# Patient Record
Sex: FEMALE | Race: OTHER | NOT HISPANIC OR LATINO | URBAN - METROPOLITAN AREA
[De-identification: names, ages, dates, MRNs, and addresses within clinical notes are randomized per-mention and may not be internally consistent; named-entity substitution may affect disease eponyms.]

---

## 2017-12-11 ENCOUNTER — EMERGENCY (EMERGENCY)
Facility: HOSPITAL | Age: 77
LOS: 1 days | Discharge: HOME | End: 2017-12-11
Attending: EMERGENCY MEDICINE
Payer: MEDICARE

## 2017-12-11 VITALS
OXYGEN SATURATION: 97 % | HEIGHT: 62 IN | TEMPERATURE: 97 F | RESPIRATION RATE: 16 BRPM | DIASTOLIC BLOOD PRESSURE: 111 MMHG | HEART RATE: 70 BPM | SYSTOLIC BLOOD PRESSURE: 181 MMHG | WEIGHT: 184.97 LBS

## 2017-12-11 VITALS
SYSTOLIC BLOOD PRESSURE: 121 MMHG | RESPIRATION RATE: 18 BRPM | HEART RATE: 65 BPM | OXYGEN SATURATION: 96 % | DIASTOLIC BLOOD PRESSURE: 68 MMHG

## 2017-12-11 PROCEDURE — 99284 EMERGENCY DEPT VISIT MOD MDM: CPT

## 2017-12-11 PROCEDURE — 70450 CT HEAD/BRAIN W/O DYE: CPT

## 2017-12-11 PROCEDURE — 93005 ELECTROCARDIOGRAM TRACING: CPT

## 2017-12-11 PROCEDURE — 70450 CT HEAD/BRAIN W/O DYE: CPT | Mod: 26

## 2017-12-11 PROCEDURE — 99284 EMERGENCY DEPT VISIT MOD MDM: CPT | Mod: 25

## 2017-12-11 RX ORDER — LABETALOL HCL 100 MG
100 TABLET ORAL ONCE
Qty: 0 | Refills: 0 | Status: COMPLETED | OUTPATIENT
Start: 2017-12-11 | End: 2017-12-11

## 2017-12-11 RX ORDER — ONDANSETRON 8 MG/1
8 TABLET, FILM COATED ORAL ONCE
Qty: 0 | Refills: 0 | Status: COMPLETED | OUTPATIENT
Start: 2017-12-11 | End: 2017-12-11

## 2017-12-11 RX ADMIN — ONDANSETRON 8 MILLIGRAM(S): 8 TABLET, FILM COATED ORAL at 21:56

## 2017-12-11 RX ADMIN — Medication 100 MILLIGRAM(S): at 20:33

## 2017-12-11 NOTE — ED PROVIDER NOTE - MEDICAL DECISION MAKING DETAILS
Pt having a hypertensive episode. Will give pt Labetalol and Zofran. Will do Ct head and EKG. Reassess.

## 2017-12-11 NOTE — ED ADULT NURSE NOTE - OBJECTIVE STATEMENT
Pt daughter gave brief summery of pt symptoms. Pt is complaining of high BP with headache and vomiting. Pt is awake, equal grasp reflex.

## 2017-12-11 NOTE — ED PROVIDER NOTE - CONSTITUTIONAL, MLM
normal... Well appearing, well nourished, awake, alert, oriented to person, place, time/situation and in no apparent distress.  Pt's BP was initially found to be (180/90).

## 2017-12-11 NOTE — ED PROVIDER NOTE - OBJECTIVE STATEMENT
69 y/o F pt w/ a PMHx of HTN presents to the  ED c/o hypertensive episode and associated HA since today. Pt states that she went to her gynecologist today for a regular check up and was found to have a BP of 210/110. She was referred to her PMD and was told to take an extra dose of Cretan. Pt denies visual changes, focal neuro deficits, and any other complaints. Pt reports associated episodes of emesis x 2. NKDA.

## 2017-12-11 NOTE — ED ADULT NURSE NOTE - NS ED NOTE ABUSE SUSPICION NEGLECT YN
Sacha Noriega Lula's SCREENING SCHEDULE   Tests on this list are recommended by your physician but may not be covered, or covered at this frequency, by your insurer. Please check with your insurance carrier before scheduling to verify coverage.    PARESH their lifetime   • Anyone with a family history    Colorectal Cancer Screening  Covered up to Age 76     Colonoscopy Screen   Covered every 10 years- more often if abnormal Colonoscopy,10 Years due on 03/26/1998   due Update Health Maintenance if applicabl Pneumococcal 13 (Prevnar)  Covered Once after 65 No orders found for this or any previous visit. today Please get once after your 65th birthday    Pneumococcal 23 (Pneumovax)  Covered Once after 65 No orders found for this or any previous visit.  up to date No

## 2018-09-06 ENCOUNTER — INPATIENT (INPATIENT)
Facility: HOSPITAL | Age: 78
LOS: 2 days | Discharge: HOME HEALTH | End: 2018-09-09
Attending: STUDENT IN AN ORGANIZED HEALTH CARE EDUCATION/TRAINING PROGRAM | Admitting: STUDENT IN AN ORGANIZED HEALTH CARE EDUCATION/TRAINING PROGRAM
Payer: MEDICARE

## 2018-09-06 VITALS
HEART RATE: 73 BPM | SYSTOLIC BLOOD PRESSURE: 197 MMHG | OXYGEN SATURATION: 98 % | RESPIRATION RATE: 18 BRPM | TEMPERATURE: 98 F | DIASTOLIC BLOOD PRESSURE: 123 MMHG

## 2018-09-06 DIAGNOSIS — Z98.890 OTHER SPECIFIED POSTPROCEDURAL STATES: Chronic | ICD-10-CM

## 2018-09-06 DIAGNOSIS — F32.9 MAJOR DEPRESSIVE DISORDER, SINGLE EPISODE, UNSPECIFIED: ICD-10-CM

## 2018-09-06 DIAGNOSIS — E87.1 HYPO-OSMOLALITY AND HYPONATREMIA: ICD-10-CM

## 2018-09-06 DIAGNOSIS — J45.909 UNSPECIFIED ASTHMA, UNCOMPLICATED: ICD-10-CM

## 2018-09-06 DIAGNOSIS — E78.00 PURE HYPERCHOLESTEROLEMIA, UNSPECIFIED: ICD-10-CM

## 2018-09-06 DIAGNOSIS — Z29.9 ENCOUNTER FOR PROPHYLACTIC MEASURES, UNSPECIFIED: ICD-10-CM

## 2018-09-06 DIAGNOSIS — I10 ESSENTIAL (PRIMARY) HYPERTENSION: ICD-10-CM

## 2018-09-06 LAB
ALBUMIN SERPL ELPH-MCNC: 4.1 G/DL — SIGNIFICANT CHANGE UP (ref 3.3–5)
ALP SERPL-CCNC: 56 U/L — SIGNIFICANT CHANGE UP (ref 40–120)
ALT FLD-CCNC: 15 U/L — SIGNIFICANT CHANGE UP (ref 4–33)
APPEARANCE UR: CLEAR — SIGNIFICANT CHANGE UP
AST SERPL-CCNC: 21 U/L — SIGNIFICANT CHANGE UP (ref 4–32)
BACTERIA # UR AUTO: NEGATIVE — SIGNIFICANT CHANGE UP
BASOPHILS # BLD AUTO: 0.05 K/UL — SIGNIFICANT CHANGE UP (ref 0–0.2)
BASOPHILS NFR BLD AUTO: 0.9 % — SIGNIFICANT CHANGE UP (ref 0–2)
BILIRUB SERPL-MCNC: 1 MG/DL — SIGNIFICANT CHANGE UP (ref 0.2–1.2)
BILIRUB UR-MCNC: NEGATIVE — SIGNIFICANT CHANGE UP
BLOOD UR QL VISUAL: NEGATIVE — SIGNIFICANT CHANGE UP
BUN SERPL-MCNC: 15 MG/DL — SIGNIFICANT CHANGE UP (ref 7–23)
CALCIUM SERPL-MCNC: 9.5 MG/DL — SIGNIFICANT CHANGE UP (ref 8.4–10.5)
CHLORIDE SERPL-SCNC: 88 MMOL/L — LOW (ref 98–107)
CHLORIDE UR-SCNC: 108 MMOL/L — SIGNIFICANT CHANGE UP
CO2 SERPL-SCNC: 26 MMOL/L — SIGNIFICANT CHANGE UP (ref 22–31)
COLOR SPEC: SIGNIFICANT CHANGE UP
CREAT ?TM UR-MCNC: 52.1 MG/DL — SIGNIFICANT CHANGE UP
CREAT SERPL-MCNC: 0.89 MG/DL — SIGNIFICANT CHANGE UP (ref 0.5–1.3)
EOSINOPHIL # BLD AUTO: 0.04 K/UL — SIGNIFICANT CHANGE UP (ref 0–0.5)
EOSINOPHIL NFR BLD AUTO: 0.7 % — SIGNIFICANT CHANGE UP (ref 0–6)
GLUCOSE SERPL-MCNC: 110 MG/DL — HIGH (ref 70–99)
GLUCOSE UR-MCNC: NEGATIVE — SIGNIFICANT CHANGE UP
HCT VFR BLD CALC: 35.2 % — SIGNIFICANT CHANGE UP (ref 34.5–45)
HGB BLD-MCNC: 12 G/DL — SIGNIFICANT CHANGE UP (ref 11.5–15.5)
HYALINE CASTS # UR AUTO: NEGATIVE — SIGNIFICANT CHANGE UP
IMM GRANULOCYTES # BLD AUTO: 0.01 # — SIGNIFICANT CHANGE UP
IMM GRANULOCYTES NFR BLD AUTO: 0.2 % — SIGNIFICANT CHANGE UP (ref 0–1.5)
KETONES UR-MCNC: SIGNIFICANT CHANGE UP
LEUKOCYTE ESTERASE UR-ACNC: SIGNIFICANT CHANGE UP
LYMPHOCYTES # BLD AUTO: 1.51 K/UL — SIGNIFICANT CHANGE UP (ref 1–3.3)
LYMPHOCYTES # BLD AUTO: 26.6 % — SIGNIFICANT CHANGE UP (ref 13–44)
MCHC RBC-ENTMCNC: 28.6 PG — SIGNIFICANT CHANGE UP (ref 27–34)
MCHC RBC-ENTMCNC: 34.1 % — SIGNIFICANT CHANGE UP (ref 32–36)
MCV RBC AUTO: 84 FL — SIGNIFICANT CHANGE UP (ref 80–100)
MONOCYTES # BLD AUTO: 0.47 K/UL — SIGNIFICANT CHANGE UP (ref 0–0.9)
MONOCYTES NFR BLD AUTO: 8.3 % — SIGNIFICANT CHANGE UP (ref 2–14)
NEUTROPHILS # BLD AUTO: 3.59 K/UL — SIGNIFICANT CHANGE UP (ref 1.8–7.4)
NEUTROPHILS NFR BLD AUTO: 63.3 % — SIGNIFICANT CHANGE UP (ref 43–77)
NITRITE UR-MCNC: NEGATIVE — SIGNIFICANT CHANGE UP
NRBC # FLD: 0 — SIGNIFICANT CHANGE UP
OSMOLALITY SERPL: 261 MOSMO/KG — LOW (ref 275–295)
OSMOLALITY UR: 422 MOSMO/KG — SIGNIFICANT CHANGE UP (ref 50–1200)
PH UR: 6.5 — SIGNIFICANT CHANGE UP (ref 5–8)
PLATELET # BLD AUTO: 173 K/UL — SIGNIFICANT CHANGE UP (ref 150–400)
PMV BLD: 11 FL — SIGNIFICANT CHANGE UP (ref 7–13)
POTASSIUM SERPL-MCNC: 4 MMOL/L — SIGNIFICANT CHANGE UP (ref 3.5–5.3)
POTASSIUM SERPL-SCNC: 4 MMOL/L — SIGNIFICANT CHANGE UP (ref 3.5–5.3)
PROT SERPL-MCNC: 7.2 G/DL — SIGNIFICANT CHANGE UP (ref 6–8.3)
PROT UR-MCNC: NEGATIVE — SIGNIFICANT CHANGE UP
RBC # BLD: 4.19 M/UL — SIGNIFICANT CHANGE UP (ref 3.8–5.2)
RBC # FLD: 11.9 % — SIGNIFICANT CHANGE UP (ref 10.3–14.5)
RBC CASTS # UR COMP ASSIST: SIGNIFICANT CHANGE UP (ref 0–?)
SODIUM SERPL-SCNC: 124 MMOL/L — LOW (ref 135–145)
SODIUM UR-SCNC: 127 MMOL/L — SIGNIFICANT CHANGE UP
SP GR SPEC: 1.01 — SIGNIFICANT CHANGE UP (ref 1–1.04)
SQUAMOUS # UR AUTO: SIGNIFICANT CHANGE UP
UROBILINOGEN FLD QL: NORMAL — SIGNIFICANT CHANGE UP
WBC # BLD: 5.67 K/UL — SIGNIFICANT CHANGE UP (ref 3.8–10.5)
WBC # FLD AUTO: 5.67 K/UL — SIGNIFICANT CHANGE UP (ref 3.8–10.5)
WBC UR QL: SIGNIFICANT CHANGE UP (ref 0–?)

## 2018-09-06 PROCEDURE — 99223 1ST HOSP IP/OBS HIGH 75: CPT

## 2018-09-06 PROCEDURE — 70450 CT HEAD/BRAIN W/O DYE: CPT | Mod: 26

## 2018-09-06 RX ORDER — HEPARIN SODIUM 5000 [USP'U]/ML
5000 INJECTION INTRAVENOUS; SUBCUTANEOUS EVERY 12 HOURS
Qty: 0 | Refills: 0 | Status: DISCONTINUED | OUTPATIENT
Start: 2018-09-06 | End: 2018-09-09

## 2018-09-06 RX ORDER — LOSARTAN POTASSIUM 100 MG/1
100 TABLET, FILM COATED ORAL DAILY
Qty: 0 | Refills: 0 | Status: DISCONTINUED | OUTPATIENT
Start: 2018-09-06 | End: 2018-09-09

## 2018-09-06 RX ORDER — ALBUTEROL 90 UG/1
2 AEROSOL, METERED ORAL EVERY 6 HOURS
Qty: 0 | Refills: 0 | Status: DISCONTINUED | OUTPATIENT
Start: 2018-09-06 | End: 2018-09-09

## 2018-09-06 RX ORDER — LABETALOL HCL 100 MG
10 TABLET ORAL ONCE
Qty: 0 | Refills: 0 | Status: COMPLETED | OUTPATIENT
Start: 2018-09-06 | End: 2018-09-06

## 2018-09-06 RX ORDER — ATORVASTATIN CALCIUM 80 MG/1
10 TABLET, FILM COATED ORAL AT BEDTIME
Qty: 0 | Refills: 0 | Status: DISCONTINUED | OUTPATIENT
Start: 2018-09-06 | End: 2018-09-09

## 2018-09-06 RX ORDER — MONTELUKAST 4 MG/1
10 TABLET, CHEWABLE ORAL DAILY
Qty: 0 | Refills: 0 | Status: DISCONTINUED | OUTPATIENT
Start: 2018-09-06 | End: 2018-09-09

## 2018-09-06 RX ORDER — PANTOPRAZOLE SODIUM 20 MG/1
40 TABLET, DELAYED RELEASE ORAL
Qty: 0 | Refills: 0 | Status: DISCONTINUED | OUTPATIENT
Start: 2018-09-06 | End: 2018-09-09

## 2018-09-06 RX ORDER — METOCLOPRAMIDE HCL 10 MG
10 TABLET ORAL ONCE
Qty: 0 | Refills: 0 | Status: COMPLETED | OUTPATIENT
Start: 2018-09-06 | End: 2018-09-06

## 2018-09-06 RX ORDER — SODIUM CHLORIDE 9 MG/ML
1000 INJECTION INTRAMUSCULAR; INTRAVENOUS; SUBCUTANEOUS
Qty: 0 | Refills: 0 | Status: DISCONTINUED | OUTPATIENT
Start: 2018-09-06 | End: 2018-09-07

## 2018-09-06 RX ORDER — ONDANSETRON 8 MG/1
4 TABLET, FILM COATED ORAL EVERY 8 HOURS
Qty: 0 | Refills: 0 | Status: DISCONTINUED | OUTPATIENT
Start: 2018-09-06 | End: 2018-09-09

## 2018-09-06 RX ADMIN — SODIUM CHLORIDE 75 MILLILITER(S): 9 INJECTION INTRAMUSCULAR; INTRAVENOUS; SUBCUTANEOUS at 23:04

## 2018-09-06 RX ADMIN — Medication 10 MILLIGRAM(S): at 19:43

## 2018-09-06 RX ADMIN — Medication 10 MILLIGRAM(S): at 19:40

## 2018-09-06 NOTE — H&P ADULT - PROBLEM SELECTOR PLAN 1
Most likely due to Dehydration, Heat, on HCTZ and Cymbalta,   Hold HCTZ and Cymbalta,   IVF NS @ 75 cc/hr x 6 hours, F/U BMP, CBC, Cortisol and Aldosterone Level,   Fall/aspiration precaution, PT consult,

## 2018-09-06 NOTE — H&P ADULT - PMH
Asthma    HTN (hypertension)    Hypercholesterolemia Allergy    Asthma    Depression    HTN (hypertension)    Hypercholesterolemia

## 2018-09-06 NOTE — H&P ADULT - MUSCULOSKELETAL
details… detailed exam no joint erythema/no calf tenderness/no joint warmth/normal strength/ROM intact/no joint swelling

## 2018-09-06 NOTE — ED ADULT TRIAGE NOTE - CHIEF COMPLAINT QUOTE
Patient with a h/o HTN arrives from home for high blood pressure, headaches, and vomiting since yesterday. Hypertensive in triage. Had her BP med this morning.  Losartan .

## 2018-09-06 NOTE — H&P ADULT - BACK
Patient admitted to room 2037. Oriented to room bed locked and low position call light within reach. detailed exam

## 2018-09-06 NOTE — ED PROVIDER NOTE - MEDICAL DECISION MAKING DETAILS
79yo F hx of htn, hld, asthma presenting with elevated BP, nausea and headache starting around 2pm today. Concern for hypertensive urgency vs intracranial issue/bleed. CTH, labs, BP control, anti-nausea meds.

## 2018-09-06 NOTE — ED PROVIDER NOTE - ATTENDING CONTRIBUTION TO CARE
Dr. Ford: I have personally performed a face to face bedside history and physical examination of this patient. I have discussed the history, examination, review of systems, assessment and plan of management with the resident. I have reviewed the electronic medical record and amended it to reflect my history, review of systems, physical exam, assessment and plan.    pt with possible hypertensive emergency. hypertensive, severe headache vomiting, headache resolved on presentation to ED.  r/o ICH/SAH, brain mass. plan for CT head, labs, pain control, IV labetalol.

## 2018-09-06 NOTE — ED ADULT NURSE REASSESSMENT NOTE - NS ED NURSE REASSESS COMMENT FT1
report received from Emmanuelle GALVEZ, pt reports relief from headache, blood pressure lower, as documented. pt denies chest pain, SOB, dizziness. pt ambulated to bathroom with assistance, gait steady.

## 2018-09-06 NOTE — ED PROVIDER NOTE - OBJECTIVE STATEMENT
77yo F hx of htn, hld, asthma presenting with elevated BP, nausea and headache starting around 2pm today. Vomited 3 times since then, each time the headache was relieved after vomiting. Still feels mildly nauseous, but headache is basically gone at this time. Took lasartan this morning, only takes one pill per day in the am. Denies cp, sob, blurry vision.

## 2018-09-06 NOTE — H&P ADULT - ASSESSMENT
77 y/o female HX of HTN, High Cholesterol, Depression, Allergy,  asthma, on HCTZ+Losartan for HTN and Cymbalta for depression,   presenting with elevated BP, nausea, Vomiting  and headache starting around 2pm today,  Vomited 3 times since then, each time the headache was relieved after vomiting. Pt took Losartan+HCTZ  this morning, as per daughter pt was outside home yesterday for 2 hours in Hot weather, Pt awake, A+O x 3 now, + Urinary frequency, no fever, no dysuria, no CP, No SOB, no cough, no diarrhea, no abdominal pain, no rash, No Visual changes, HEAD CT Unremarkable, HA as well as N/V resolved, elevated BP in the ER, S/P IV Labetalol 10 mg x 1 for /122 which improved, IV Reglan 10 mg x 1 given by ER as well with Help, pt is accompanied with her daughter in the ER tonight, No Dizziness, Feels Better now, + Hyponatremia , Na 124 , possible due to Dehydration, I started the pt on IVF NS @ 75 cc/hr x 6 hours ,

## 2018-09-06 NOTE — ED ADULT NURSE NOTE - OBJECTIVE STATEMENT
p/t is a 78y old female with hx HTn c/o of headaches and high b/p readings for past few days, p/t denies any chest pain, no neuro deficits noted, bloods drawn and sent to lab, will continue to monitor

## 2018-09-06 NOTE — ED PROVIDER NOTE - PHYSICAL EXAMINATION
Gen: No acute distress, alert, cooperative  Head: Normocephalic, Atraumatic  HEENT: PERRL, normal conjunctiva  Lung: CTAB, no respiratory distress, no crackles or wheezes  CV: rrr, no murmur  Abd: soft, NTND, no rebound or guarding  MSK: No LE edema, no visible deformities  Neuro: No focal neurologic deficits, normal strength and sensation all extremities, cranial nerves II-XII intact.   Skin: Warm and dry, no evidence of rash   Psych: normal affect, follows commands

## 2018-09-06 NOTE — ED ADULT NURSE NOTE - NSIMPLEMENTINTERV_GEN_ALL_ED
Implemented All Fall Risk Interventions:  [place] to call system. Call bell, personal items and telephone within reach. Instruct patient to call for assistance. Room bathroom lighting operational. Non-slip footwear when patient is off stretcher. Physically safe environment: no spills, clutter or unnecessary equipment. Stretcher in lowest position, wheels locked, appropriate side rails in place. Provide visual cue, wrist band, yellow gown, etc. Monitor gait and stability. Monitor for mental status changes and reorient to person, place, and time. Review medications for side effects contributing to fall risk. Reinforce activity limits and safety measures with patient and family.

## 2018-09-06 NOTE — H&P ADULT - HISTORY OF PRESENT ILLNESS
77 y/o female HX of HTN, High Cholesterol, Depression, Allergy,  asthma, on HCTZ+Losartan for HTN and Cymbalta for depression,   presenting with elevated BP, nausea, Vomiting  and headache starting around 2pm today,  Vomited 3 times since then, each time the headache was relieved after vomiting. Pt took Losartan+HCTZ  this morning, as per daughter pt was outside home yesterday for 2 hours in Hot weather, Pt awake, A+O x 3 now, + Urinary frequency, no fever, no dysuria, no CP, No SOB, no cough, no diarrhea, no abdominal pain, no rash, No Visual changes, HEAD CT Unremarkable, HA as well as N/V resolved, elevated BP in the ER, S/P IV Labetalol 10 mg x 1 for /122 which improved, IV Reglan 10 mg x 1 given by ER as well with Help, pt is accompanied with her daughter in the ER tonight, No Dizziness, Feels Better now, + Hyponatremia , Na 124 , possible due to Dehydration, I started the pt on IVF NS @ 75 cc/hr x 6 hours ,     Labs: Urine Osm 422, UA: Trace Ketone, Na 124, K+ 4, BUN 15, Creatinine 0.89, Glucose 110, Serum , LFT Normal, WBC 5.67, Hgb 12, Platelet 173,     Vitals: Tem 98, /68, HR 64, RR 18, 95% RA 77 y/o female HX of HTN, High Cholesterol, Depression, Allergy,  asthma, on HCTZ+Losartan for HTN and Cymbalta for depression,   presenting with elevated BP, nausea, Vomiting  and headache starting around 2pm today,  Vomited 3 times since then, each time the headache was relieved after vomiting. Pt took Losartan+HCTZ  this morning, as per daughter pt was outside home yesterday for 2 hours in Hot weather, Pt awake, A+O x 3 now, + Urinary frequency, no fever, no dysuria, no CP, No SOB, no cough, no diarrhea, no abdominal pain, no rash, No Visual changes, HEAD CT Unremarkable, HA as well as N/V resolved, elevated BP in the ER, S/P IV Labetalol 10 mg x 1 for /122 which improved, IV Reglan 10 mg x 1 given by ER as well with Help, pt is accompanied with her daughter in the ER tonight, No Dizziness, Feels Better now, + Hyponatremia , Na 124 , possible due to Dehydration, I started the pt on IVF NS @ 75 cc/hr x 6 hours ,     Family HX: Noncontributory to Current pt's medical condition,     Labs: Urine Osm 422, UA: Trace Ketone, Na 124, K+ 4, BUN 15, Creatinine 0.89, Glucose 110, Serum , LFT Normal, WBC 5.67, Hgb 12, Platelet 173,     Vitals: Tem 98, /68, HR 64, RR 18, 95% RA

## 2018-09-06 NOTE — ED ADULT NURSE REASSESSMENT NOTE - NS ED NURSE REASSESS COMMENT FT1
pt ambulated to bathroom with assistance, gait steady, no chest pain, SOB, dizziness. urine labs sent. IV fluids infusing well. vital signs as noted. MD at bedside.

## 2018-09-07 ENCOUNTER — TRANSCRIPTION ENCOUNTER (OUTPATIENT)
Age: 78
End: 2018-09-07

## 2018-09-07 DIAGNOSIS — R11.2 NAUSEA WITH VOMITING, UNSPECIFIED: ICD-10-CM

## 2018-09-07 PROBLEM — I10 ESSENTIAL (PRIMARY) HYPERTENSION: Chronic | Status: ACTIVE | Noted: 2017-12-11

## 2018-09-07 LAB
APTT BLD: 26.4 SEC — LOW (ref 27.5–37.4)
BASOPHILS # BLD AUTO: 0.03 K/UL — SIGNIFICANT CHANGE UP (ref 0–0.2)
BASOPHILS NFR BLD AUTO: 0.6 % — SIGNIFICANT CHANGE UP (ref 0–2)
BUN SERPL-MCNC: 16 MG/DL — SIGNIFICANT CHANGE UP (ref 7–23)
CALCIUM SERPL-MCNC: 8.8 MG/DL — SIGNIFICANT CHANGE UP (ref 8.4–10.5)
CHLORIDE SERPL-SCNC: 89 MMOL/L — LOW (ref 98–107)
CO2 SERPL-SCNC: 22 MMOL/L — SIGNIFICANT CHANGE UP (ref 22–31)
CREAT SERPL-MCNC: 0.99 MG/DL — SIGNIFICANT CHANGE UP (ref 0.5–1.3)
EOSINOPHIL # BLD AUTO: 0.02 K/UL — SIGNIFICANT CHANGE UP (ref 0–0.5)
EOSINOPHIL NFR BLD AUTO: 0.4 % — SIGNIFICANT CHANGE UP (ref 0–6)
GLUCOSE SERPL-MCNC: 124 MG/DL — HIGH (ref 70–99)
HAV IGM SER-ACNC: NONREACTIVE — SIGNIFICANT CHANGE UP
HBA1C BLD-MCNC: 5.5 % — SIGNIFICANT CHANGE UP (ref 4–5.6)
HBV CORE IGM SER-ACNC: NONREACTIVE — SIGNIFICANT CHANGE UP
HBV SURFACE AG SER-ACNC: NONREACTIVE — SIGNIFICANT CHANGE UP
HCT VFR BLD CALC: 33.7 % — LOW (ref 34.5–45)
HCV AB S/CO SERPL IA: 0.13 S/CO — SIGNIFICANT CHANGE UP
HCV AB SERPL-IMP: SIGNIFICANT CHANGE UP
HGB BLD-MCNC: 11.4 G/DL — LOW (ref 11.5–15.5)
IMM GRANULOCYTES # BLD AUTO: 0.01 # — SIGNIFICANT CHANGE UP
IMM GRANULOCYTES NFR BLD AUTO: 0.2 % — SIGNIFICANT CHANGE UP (ref 0–1.5)
INR BLD: 0.98 — SIGNIFICANT CHANGE UP (ref 0.88–1.17)
LYMPHOCYTES # BLD AUTO: 1.33 K/UL — SIGNIFICANT CHANGE UP (ref 1–3.3)
LYMPHOCYTES # BLD AUTO: 26.4 % — SIGNIFICANT CHANGE UP (ref 13–44)
MCHC RBC-ENTMCNC: 28.4 PG — SIGNIFICANT CHANGE UP (ref 27–34)
MCHC RBC-ENTMCNC: 33.8 % — SIGNIFICANT CHANGE UP (ref 32–36)
MCV RBC AUTO: 84 FL — SIGNIFICANT CHANGE UP (ref 80–100)
MONOCYTES # BLD AUTO: 0.43 K/UL — SIGNIFICANT CHANGE UP (ref 0–0.9)
MONOCYTES NFR BLD AUTO: 8.5 % — SIGNIFICANT CHANGE UP (ref 2–14)
NEUTROPHILS # BLD AUTO: 3.21 K/UL — SIGNIFICANT CHANGE UP (ref 1.8–7.4)
NEUTROPHILS NFR BLD AUTO: 63.9 % — SIGNIFICANT CHANGE UP (ref 43–77)
NRBC # FLD: 0 — SIGNIFICANT CHANGE UP
PLATELET # BLD AUTO: 152 K/UL — SIGNIFICANT CHANGE UP (ref 150–400)
PMV BLD: 10.4 FL — SIGNIFICANT CHANGE UP (ref 7–13)
POTASSIUM SERPL-MCNC: 4.4 MMOL/L — SIGNIFICANT CHANGE UP (ref 3.5–5.3)
POTASSIUM SERPL-SCNC: 4.4 MMOL/L — SIGNIFICANT CHANGE UP (ref 3.5–5.3)
PROTHROM AB SERPL-ACNC: 11.3 SEC — SIGNIFICANT CHANGE UP (ref 9.8–13.1)
RBC # BLD: 4.01 M/UL — SIGNIFICANT CHANGE UP (ref 3.8–5.2)
RBC # FLD: 11.9 % — SIGNIFICANT CHANGE UP (ref 10.3–14.5)
SODIUM SERPL-SCNC: 126 MMOL/L — LOW (ref 135–145)
TSH SERPL-MCNC: 2.36 UIU/ML — SIGNIFICANT CHANGE UP (ref 0.27–4.2)
WBC # BLD: 5.03 K/UL — SIGNIFICANT CHANGE UP (ref 3.8–10.5)
WBC # FLD AUTO: 5.03 K/UL — SIGNIFICANT CHANGE UP (ref 3.8–10.5)

## 2018-09-07 RX ORDER — SODIUM CHLORIDE 9 MG/ML
600 INJECTION INTRAMUSCULAR; INTRAVENOUS; SUBCUTANEOUS
Qty: 0 | Refills: 0 | Status: DISCONTINUED | OUTPATIENT
Start: 2018-09-07 | End: 2018-09-08

## 2018-09-07 RX ADMIN — MONTELUKAST 10 MILLIGRAM(S): 4 TABLET, CHEWABLE ORAL at 11:16

## 2018-09-07 RX ADMIN — PANTOPRAZOLE SODIUM 40 MILLIGRAM(S): 20 TABLET, DELAYED RELEASE ORAL at 05:57

## 2018-09-07 RX ADMIN — HEPARIN SODIUM 5000 UNIT(S): 5000 INJECTION INTRAVENOUS; SUBCUTANEOUS at 05:57

## 2018-09-07 RX ADMIN — LOSARTAN POTASSIUM 100 MILLIGRAM(S): 100 TABLET, FILM COATED ORAL at 05:57

## 2018-09-07 RX ADMIN — HEPARIN SODIUM 5000 UNIT(S): 5000 INJECTION INTRAVENOUS; SUBCUTANEOUS at 17:45

## 2018-09-07 RX ADMIN — SODIUM CHLORIDE 75 MILLILITER(S): 9 INJECTION INTRAMUSCULAR; INTRAVENOUS; SUBCUTANEOUS at 17:42

## 2018-09-07 RX ADMIN — ATORVASTATIN CALCIUM 10 MILLIGRAM(S): 80 TABLET, FILM COATED ORAL at 21:38

## 2018-09-07 NOTE — PHYSICAL THERAPY INITIAL EVALUATION ADULT - RANGE OF MOTION EXAMINATION, REHAB EVAL
bilateral lower extremity ROM was WFL (within functional limits)/bilateral upper extremity ROM was WFL (within functional limits)/(not formally assessed)

## 2018-09-07 NOTE — CONSULT NOTE ADULT - PROBLEM SELECTOR RECOMMENDATION 9
possible sec to n/v on HCTZ vs SIADH, work up consistent with SIADH however urine Na could be high from HCTZ, s/p NS @ 75cc/hr x6 hrs. still pending BMP.   hold HCTZ, fluid restriction <1L/day for now patient and daughter educated.  Na should not be corrected >8meq in 24hrs  BMP STAT, repeat bmp in 4 hrs if Scr <125. please call Dr. Noel with Na result ([phone])  check TSH and cortisol level  monitor Pt with acute hyponatremia likely hypovolumic hyponatremia in setting of vomiting, and diuretic use.  Urine work up consistent with SIADH however urine Na could be high from HCTZ use , and urine osmo could be high from dehydration.   s/p NS @ 75cc/hr x6 hrs with improvement in serum sodium to 126, makes SIADH unlikely   Continue to hold HCTZ  Recommend NS at 75cc/hr for 6 more hours.   Check repeat BMP and call Dr. Noel at [phone] with results.   Na should not be corrected >8meq in 24hrs  Pt stable to be discharged once Serum sodium >130.   check TSH and cortisol level  monitor

## 2018-09-07 NOTE — CONSULT NOTE ADULT - REASON FOR ADMISSION
HA, + N/V, Dehydration, Elevated Blood Pressure

## 2018-09-07 NOTE — DISCHARGE NOTE ADULT - MEDICATION SUMMARY - MEDICATIONS TO TAKE
I will START or STAY ON the medications listed below when I get home from the hospital:    DULoxetine 60 mg oral delayed release capsule  -- 1 cap(s) by mouth once a day  -- Indication: For Depression    ZyrTEC 10 mg oral tablet  -- 1 tab(s) by mouth once a day  -- Indication: For Asthma    Crestor 10 mg oral tablet  -- 1 tab(s) by mouth once a day (at bedtime)  -- Indication: For Hypercholesterolemia    losartan-hydrochlorothiazide 100mg-12.5mg oral tablet  -- 1 tab(s) by mouth once a day  -- Indication: For HTN (hypertension)    Ventolin HFA 90 mcg/inh inhalation aerosol  -- 2 puff(s) inhaled 4 times a day, As Needed  -- Indication: For Asthma    Senna 8.6 mg oral tablet  -- 1 tab(s) by mouth 2 times a day  -- Indication: For Constipation    Singulair 10 mg oral tablet  -- 1 tab(s) by mouth once a day (in the evening)  -- Indication: For Asthma    PriLOSEC 40 mg oral delayed release capsule  -- 1 cap(s) by mouth once a day  -- Indication: For Preventive measure    Calcium 600+D 600 mg-200 intl units oral tablet  -- 1 tab(s) by mouth 2 times a day  -- Indication: For Preventive measure    ascorbic acid 500 mg oral tablet  -- 1 tab(s) by mouth once a day  -- Indication: For Preventive measure

## 2018-09-07 NOTE — PROGRESS NOTE ADULT - SUBJECTIVE AND OBJECTIVE BOX
Patient is a 78y old  Female who presents with a chief complaint of HA, + N/V, Dehydration, Elevated Blood Pressure (06 Sep 2018 22:19)      INTERVAL HPI/OVERNIGHT EVENTS:  T(C): 36.7 (18 @ 04:11), Max: 36.7 (18 @ 18:59)  HR: 60 (18 @ 04:11) (57 - 75)  BP: 133/63 (18 @ 04:11) (128/62 - 212/85)  RR: 18 (18 @ 04:11) (18 - 18)  SpO2: 98% (18 @ 04:11) (95% - 98%)  Wt(kg): --  I&O's Summary      LABS:                        11.4   5.03  )-----------( 152      ( 07 Sep 2018 07:40 )             33.7         124<L>  |  88<L>  |  15  ----------------------------<  110<H>  4.0   |  26  |  0.89    Ca    9.5      06 Sep 2018 19:30    TPro  7.2  /  Alb  4.1  /  TBili  1.0  /  DBili  x   /  AST  21  /  ALT  15  /  AlkPhos  56  -06    PT/INR - ( 07 Sep 2018 07:40 )   PT: 11.3 SEC;   INR: 0.98          PTT - ( 07 Sep 2018 07:40 )  PTT:26.4 SEC  Urinalysis Basic - ( 06 Sep 2018 22:50 )    Color: LIGHT YELLOW / Appearance: CLEAR / S.013 / pH: 6.5  Gluc: NEGATIVE / Ketone: TRACE  / Bili: NEGATIVE / Urobili: NORMAL   Blood: NEGATIVE / Protein: NEGATIVE / Nitrite: NEGATIVE   Leuk Esterase: SMALL / RBC: 3-5 / WBC 3-5   Sq Epi: OCC / Non Sq Epi: x / Bacteria: NEGATIVE      CAPILLARY BLOOD GLUCOSE            Urinalysis Basic - ( 06 Sep 2018 22:50 )    Color: LIGHT YELLOW / Appearance: CLEAR / S.013 / pH: 6.5  Gluc: NEGATIVE / Ketone: TRACE  / Bili: NEGATIVE / Urobili: NORMAL   Blood: NEGATIVE / Protein: NEGATIVE / Nitrite: NEGATIVE   Leuk Esterase: SMALL / RBC: 3-5 / WBC 3-5   Sq Epi: OCC / Non Sq Epi: x / Bacteria: NEGATIVE        MEDICATIONS  (STANDING):  atorvastatin 10 milliGRAM(s) Oral at bedtime  heparin  Injectable 5000 Unit(s) SubCutaneous every 12 hours  losartan 100 milliGRAM(s) Oral daily  montelukast 10 milliGRAM(s) Oral daily  pantoprazole    Tablet 40 milliGRAM(s) Oral before breakfast  sodium chloride 0.9%. 1000 milliLiter(s) (75 mL/Hr) IV Continuous <Continuous>    MEDICATIONS  (PRN):  ALBUTerol    90 MICROgram(s) HFA Inhaler 2 Puff(s) Inhalation every 6 hours PRN Wheezing  ondansetron Injectable 4 milliGRAM(s) IV Push every 8 hours PRN Nausea and/or Vomiting          PHYSICAL EXAM:  GENERAL: NAD, well-groomed, well-developed  HEAD:  Atraumatic, Normocephalic  CHEST/LUNG: Clear to percussion bilaterally; No rales, rhonchi, wheezing, or rubs  HEART: Regular rate and rhythm; No murmurs, rubs, or gallops  ABDOMEN: Soft, Nontender, Nondistended; Bowel sounds present  EXTREMITIES:  2+ Peripheral Pulses, No clubbing, cyanosis, or edema  LYMPH: No lymphadenopathy noted  SKIN: No rashes or lesions    Care Discussed with Consultants/Other Providers [ +] YES  [ ] NO

## 2018-09-07 NOTE — PHYSICAL THERAPY INITIAL EVALUATION ADULT - PERTINENT HX OF CURRENT PROBLEM, REHAB EVAL
Pt. is an 77 y/o Surinamese-speaking female admitted to [facility] on 09/06/18 with a dx of hyponatremia, headache, nausea, vomiting, dehydration, and elevated BP.  PT consult request secondary to fall.  H/O Depression.  (-) head CT for acute pathology.

## 2018-09-07 NOTE — PHYSICAL THERAPY INITIAL EVALUATION ADULT - PATIENT PROFILE REVIEW, REHAB EVAL
yes/ACTIVITY: ambulate as tolerated with assistance; consult with Noelle Kim RN  --> pt. OK for PT as tolerated

## 2018-09-07 NOTE — PHYSICAL THERAPY INITIAL EVALUATION ADULT - ADDITIONAL COMMENTS
Pt. left in chair post-PT in NAD with all lines/tubes intact & call bell within reach.  RN Noelle URIBE. milvia.

## 2018-09-07 NOTE — ED ADULT NURSE REASSESSMENT NOTE - NS ED NURSE REASSESS COMMENT FT1
report given to E[facility] 1 RN, pt sleeping, appears comfortable, respirations equal and non labored. IV fluids infusing well.

## 2018-09-07 NOTE — CONSULT NOTE ADULT - SUBJECTIVE AND OBJECTIVE BOX
//[facility]   MD MICHAEL REVELES MD ANGELA WONG, PA    TEL:  OFFICE: [phone]  DR MAYER CELL: [phone]  DR. RAY CELL: [phone]  PHUC ZARCO CELL: [phone]      HPI:  77 y/o female HX of HTN, High Cholesterol, Depression, Allergy,  asthma, on HCTZ+Losartan for HTN and Cymbalta for depression,  presenting with elevated BP, severe headache, nausea, Vomiting starting around 2pm ,  Vomited 3 times since then, denied fever, dysuria,  CP,  SOB,  cough,  diarrhea,  abdominal pain, Visual changes, HEAD CT Unremarkable, HA as well as N/V resolved. Blood work done showed  + Hyponatremia was given IVF NS @ 75 cc/hr x 6 hours . pending lab.   Per patient she was in her usual state of health before, last blood work done 3 month ago without and abnormalities, she is on HCTZ/Losartan for a long time per daughter. Currently patient denied any complaints       Allergies:  No Known Allergies      PAST MEDICAL & SURGICAL HISTORY:  Allergy  Depression  HTN (hypertension)  Hypercholesterolemia  Asthma  S/P hernia surgery      Home Medications Reviewed    Hospital Medications:   MEDICATIONS  (STANDING):  atorvastatin 10 milliGRAM(s) Oral at bedtime  heparin  Injectable 5000 Unit(s) SubCutaneous every 12 hours  losartan 100 milliGRAM(s) Oral daily  montelukast 10 milliGRAM(s) Oral daily  pantoprazole    Tablet 40 milliGRAM(s) Oral before breakfast  sodium chloride 0.9%. 1000 milliLiter(s) (75 mL/Hr) IV Continuous <Continuous>      SOCIAL HISTORY:  Denies ETOh, Smoking,     FAMILY HISTORY:  No pertinent family history in first degree relatives      REVIEW OF SYSTEMS:  CONSTITUTIONAL: No weakness, fevers or chills  EYES/ENT: No visual changes;  No vertigo or throat pain   NECK: No pain or stiffness  RESPIRATORY: No cough, wheezing, hemoptysis; No shortness of breath  CARDIOVASCULAR: No chest pain or palpitations.  GASTROINTESTINAL: see HPI  GENITOURINARY: No dysuria, frequency, foamy urine, urinary urgency, incontinence or hematuria  NEUROLOGICAL: see HPI  SKIN: No itching, burning, rashes, or lesions   VASCULAR: No bilateral lower extremity edema.   All other review of systems is negative unless indicated above.    VITALS:  T(F): 98.1 (18 @ 13:41), Max: 98.1 (18 @ 13:41)  HR: 60 (18 @ :41)  BP: 138/72 (18 @ 13:41)  RR: 18 (18 @ 13:41)  SpO2: 99% (18 @ :41)  Wt(kg): --    Height (cm): 152.4 (:11)  Weight (kg): 58.1 (:11)  BMI (kg/m2): 25 (:)  BSA (m2): 1.54 (:)    PHYSICAL EXAM:  Constitutional: NAD  HEENT: anicteric sclera, oropharynx clear, MMM  Neck: No JVD  Respiratory: CTAB, no wheezes, rales or rhonchi  Cardiovascular: S1, S2, RRR  Gastrointestinal: BS+, soft, NT/ND  Extremities: No cyanosis or clubbing. No peripheral edema  Neurological: A/O x 3, no focal deficits  Psychiatric: Normal mood, normal affect  : No CVA tenderness. No russell.   Skin: No rashes      LABS:      124<L>  |  88<L>  |  15  ----------------------------<  110<H>  4.0   |  26  |  0.89    Ca    9.5      06 Sep 2018 19:30    TPro  7.2  /  Alb  4.1  /  TBili  1.0  /  DBili      /  AST  21  /  ALT  15  /  AlkPhos  56      Creatinine Trend: 0.89 <--                        11.4   5.03  )-----------( 152      ( 07 Sep 2018 07:40 )             33.7     Urine Studies:  Urinalysis Basic - ( 06 Sep 2018 22:50 )    Color: LIGHT YELLOW / Appearance: CLEAR / S.013 / pH: 6.5  Gluc: NEGATIVE / Ketone: TRACE  / Bili: NEGATIVE / Urobili: NORMAL   Blood: NEGATIVE / Protein: NEGATIVE / Nitrite: NEGATIVE   Leuk Esterase: SMALL / RBC: 3-5 / WBC 3-5   Sq Epi: OCC / Non Sq Epi:  / Bacteria: NEGATIVE      Creatinine, Random Urine: 52.10 mg/dL ( @ 22:50)  Sodium, Random Urine: 127 mmol/L ( @ 22:50)  Osmolality, Random Urine: 422 mosmo/kg ( @ 22:50)  Chloride, Random Urine: 108 mmol/L ( @ 22:50)      RADIOLOGY & ADDITIONAL STUDIES: //[facility]   MD MICHAEL REVELES MD ANGELA WONG, PA    TEL:  OFFICE: [phone]  DR MAYER CELL: [phone]  DR. RAY CELL: [phone]  PHUC ZARCO CELL: [phone]      HPI:  79 y/o female HX of HTN, High Cholesterol, Depression, Allergy,  asthma, on HCTZ+Losartan for HTN and Cymbalta for depression,  presenting with elevated BP, severe headache, nausea, Vomiting starting around 2pm .  CT HEAD  Unremarkable.  Nephrology consulted for Hyponatremia. Per daughter , never any prior issues of low sodium  Was started on HCTZ/Losartan 1 month ago.   Pt sodium was 124, given NS for 6 hours with improvement in sodium to 126.   Now headache, vomiting resolved    Allergies:  No Known Allergies      PAST MEDICAL & SURGICAL HISTORY:  Allergy  Depression  HTN (hypertension)  Hypercholesterolemia  Asthma  S/P hernia surgery      Home Medications Reviewed    Hospital Medications:   MEDICATIONS  (STANDING):  atorvastatin 10 milliGRAM(s) Oral at bedtime  heparin  Injectable 5000 Unit(s) SubCutaneous every 12 hours  losartan 100 milliGRAM(s) Oral daily  montelukast 10 milliGRAM(s) Oral daily  pantoprazole    Tablet 40 milliGRAM(s) Oral before breakfast  sodium chloride 0.9%. 1000 milliLiter(s) (75 mL/Hr) IV Continuous <Continuous>      SOCIAL HISTORY:  Denies ETOh, Smoking,     FAMILY HISTORY:  No pertinent family history in first degree relatives      REVIEW OF SYSTEMS:  CONSTITUTIONAL: No weakness, fevers or chills  EYES/ENT: No visual changes;  No vertigo or throat pain   NECK: No pain or stiffness  RESPIRATORY: No cough, wheezing, hemoptysis; No shortness of breath  CARDIOVASCULAR: No chest pain or palpitations.  GASTROINTESTINAL: see HPI  GENITOURINARY: No dysuria, frequency, foamy urine, urinary urgency, incontinence or hematuria  NEUROLOGICAL: see HPI  SKIN: No itching, burning, rashes, or lesions   VASCULAR: No bilateral lower extremity edema.   All other review of systems is negative unless indicated above.    VITALS:  T(F): 98.1 (18 @ 13:41), Max: 98.1 (18 @ 13:41)  HR: 60 (18 @ 13:41)  BP: 138/72 (18 @ 13:41)  RR: 18 (18 @ 13:41)  SpO2: 99% (18 @ 13:41)  Wt(kg): --    Height (cm): 152.4 ( @ 04:11)  Weight (kg): 58.1 ( @ 04:11)  BMI (kg/m2): 25 (:11)  BSA (m2): 1.54 (:11)    PHYSICAL EXAM:  Constitutional: NAD  HEENT: anicteric sclera, oropharynx clear, MMM  Neck: No JVD  Respiratory: CTAB, no wheezes, rales or rhonchi  Cardiovascular: S1, S2, RRR  Gastrointestinal: BS+, soft, NT/ND  Extremities: No cyanosis or clubbing. No peripheral edema  Neurological: A/O x 3, no focal deficits  Psychiatric: Normal mood, normal affect  : No CVA tenderness. No russell.   Skin: No rashes      LABS:      124<L>  |  88<L>  |  15  ----------------------------<  110<H>  4.0   |  26  |  0.89    Ca    9.5      06 Sep 2018 19:30    TPro  7.2  /  Alb  4.1  /  TBili  1.0  /  DBili      /  AST  21  /  ALT  15  /  AlkPhos  56      Creatinine Trend: 0.89 <--                        11.4   5.03  )-----------( 152      ( 07 Sep 2018 07:40 )             33.7     Urine Studies:  Urinalysis Basic - ( 06 Sep 2018 22:50 )    Color: LIGHT YELLOW / Appearance: CLEAR / S.013 / pH: 6.5  Gluc: NEGATIVE / Ketone: TRACE  / Bili: NEGATIVE / Urobili: NORMAL   Blood: NEGATIVE / Protein: NEGATIVE / Nitrite: NEGATIVE   Leuk Esterase: SMALL / RBC: 3-5 / WBC 3-5   Sq Epi: OCC / Non Sq Epi:  / Bacteria: NEGATIVE      Creatinine, Random Urine: 52.10 mg/dL ( @ 22:50)  Sodium, Random Urine: 127 mmol/L ( @ 22:50)  Osmolality, Random Urine: 422 mosmo/kg ( @ 22:50)  Chloride, Random Urine: 108 mmol/L ( @ 22:50)      RADIOLOGY & ADDITIONAL STUDIES:

## 2018-09-07 NOTE — DISCHARGE NOTE ADULT - CARE PROVIDER_API CALL
Dr. Lozano,   Phone: [phone]  Fax: (   )    -    Star Lara (DO), Gastroenterology; Internal Medicine  [address] [place] [address]  Phone: [phone]  Fax: [phone]

## 2018-09-07 NOTE — DISCHARGE NOTE ADULT - HOME CARE AGENCY
[facility]  for reinstatement of aide SUN 7/9/2018 as per prior to admission through [facility] , fax

## 2018-09-07 NOTE — CONSULT NOTE ADULT - PROBLEM SELECTOR RECOMMENDATION 9
likely in the setting of hypertensive urgency and hyponatermia  currently symptoms resolved  diet as tolerated  no further gi workup needed

## 2018-09-07 NOTE — PROGRESS NOTE ADULT - ASSESSMENT
Problem/Plan - 1:  ·  Problem: Hyponatremia.  Plan: Most likely due to Dehydration, Heat, on HCTZ and Cymbalta,   Hold HCTZ and Cymbalta,   renal called for consult    Problem/Plan - 2:  ·  Problem: HTN (hypertension).  Plan: cw home meds  will monitor     Problem/Plan - 3:  ·  Problem: Hypercholesterolemia.  Plan: cw lipitor    Problem/Plan - 4:  ·  Problem: Depression.  Plan: Hold Cymbalta due to Hyponatremia and dehydration,    Problem/Plan - 5:  ·  Problem: Asthma.  Plan: Stable, on Singulair, Ventolin  PRN,

## 2018-09-07 NOTE — CONSULT NOTE ADULT - SUBJECTIVE AND OBJECTIVE BOX
HISTORY OF PRESENT ILLNESS:  79 y/o female HX of HTN, High Cholesterol, CVA (no residual), Depression,  asthma,  presenting with elevated BP (197/123), nausea, Vomiting  and headache starting around 2pm yesterday. The patient states her symptoms started acutely and were not associated with any anginal symptoms. Her headache and nausea were relieved by vomiting which occurred 3 times.  In the Ed, she was found to have HTN urgency with hyponatremia. She denies any h/o CAD/MI and states that she has no anginal symptoms with about 4 mets of exercise tolerance. She denies prior cardiac testing.     PAST MEDICAL & SURGICAL HISTORY:  Allergy  Depression  HTN (hypertension)  Hypercholesterolemia  Asthma  S/P hernia surgery        MEDICATIONS  (STANDING):  atorvastatin 10 milliGRAM(s) Oral at bedtime  heparin  Injectable 5000 Unit(s) SubCutaneous every 12 hours  losartan 100 milliGRAM(s) Oral daily  montelukast 10 milliGRAM(s) Oral daily  pantoprazole    Tablet 40 milliGRAM(s) Oral before breakfast  sodium chloride 0.9%. 1000 milliLiter(s) (75 mL/Hr) IV Continuous <Continuous>      Allergies  No Known Allergies      FAMILY HISTORY:  No pertinent family history in first degree relatives  Non-contributary for premature coronary disease or sudden cardiac death    SOCIAL HISTORY:    [x ] Non-smoker  [ ] Smoker  [x ] Alcohol      REVIEW OF SYSTEMS:  [ ]chest pain  [  ]shortness of breath  [  ]palpitations  [  ]syncope  [ ]near syncope [ ]upper extremity weakness   [ ] lower extremity weakness  [  ]diplopia  [  ]altered mental status [x] headache  [  ]fevers  [ ]chills x[ ]nausea  [ x]vomitting  [  ]dysphagia    [ ]abdominal pain  [ ]melena  [ ]BRBPR    [  ]epistaxis  [  ]rash    [ ]lower extremity edema        [x ] All others negative	  [ ] Unable to obtain    PHYSICAL EXAM:  T(C): 36.7 (09-07-18 @ 04:11), Max: 36.7 (09-06-18 @ 18:59)  HR: 60 (09-07-18 @ 04:11) (57 - 75)  BP: 133/63 (09-07-18 @ 04:11) (128/62 - 212/85)  RR: 18 (09-07-18 @ 04:11) (18 - 18)  SpO2: 98% (09-07-18 @ 04:11) (95% - 98%)  Wt(kg): --    Appearance: Normal	  HEENT:   Normal oral mucosa, PERRL, EOMI	  Lymphatic: No lymphadenopathy , no edema  Cardiovascular: Normal S1 S2, No JVD, No murmurs , Peripheral pulses palpable 2+ bilaterally  Respiratory: Lungs clear to auscultation, normal effort 	  Gastrointestinal:  Soft, Non-tender, + BS	  Skin: No rashes, No ecchymoses, No cyanosis, warm to touch  Musculoskeletal: Normal range of motion, normal strength  Psychiatry:  Mood & affect appropriate      TELEMETRY: 	  n/a  ECG:  	NSR narrow QRS no ischemia    Echo: n/a  NST: n/a  Cath: n/a  	  	  LABS:	 	                          11.4   5.03  )-----------( 152      ( 07 Sep 2018 07:40 )             33.7     09-06    124<L>  |  88<L>  |  15  ----------------------------<  110<H>  4.0   |  26  |  0.89    Ca    9.5      06 Sep 2018 19:30    TPro  7.2  /  Alb  4.1  /  TBili  1.0  /  DBili  x   /  AST  21  /  ALT  15  /  AlkPhos  56  09-06      HgA1c: Hemoglobin A1C, Whole Blood: 5.5 % (09-07 @ 07:40)      CT Head No Cont (09.06.18 @ 20:06) >  IMPRESSION:   No CT evidence of acute intracranial hemorrhage, brain edema, mass effect   or acute territorial infarct. 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 No change since 12/11/2017.          ASSESSMENT/PLAN:   79 y/o female HX of HTN, High Cholesterol, CVA (no residual) Depression,  asthma,  presenting with elevated BP (197/123), nausea, Vomiting  and headache starting around 2pm yesterday. The patient states her symptoms started acutely and were not associated with any anginal symptoms or syncope or palpitations. Her headache and nausea were relieved by vomiting which occurred 3 times.  In the Ed, she was found to have HTN urgency with hyponatremia. She denies any h/o CAD/MI and states that she has no anginal symptoms with about 4 mets of exercise tolerance. She denies prior cardiac testing.     --EKG with NSR narrow QRS  -- HTN - BP improved with home dose Cozaar 100mg and 1 time dose of Labetalol 10mg IVP given 9/6             - home dose HCTZ (12.5) held given hyponatremia  -- Hyponatremia- would repeat BMP today  -- HD stable no evidence CHF  -- could check TTE but could likely be done as outpt  -- will d/w attending

## 2018-09-07 NOTE — CONSULT NOTE ADULT - PROBLEM SELECTOR PROBLEM 1
Nausea and vomiting, intractability of vomiting not specified, unspecified vomiting type
Hyponatremia

## 2018-09-07 NOTE — CONSULT NOTE ADULT - SUBJECTIVE AND OBJECTIVE BOX
[facility]  Tu Aguirre PA-C  [address] [place] [address]  [phone]      Chief Complaint:  Patient is a 78y old  Female who presents with a chief complaint of HA, + N/V, Dehydration, Elevated Blood Pressure (07 Sep 2018 14:51)      HPI:  79 y/o female HX of HTN, High Cholesterol, Depression, Allergy,  asthma, on HCTZ+Losartan for HTN and Cymbalta for depression,   presenting with elevated BP, nausea, Vomiting  and headache starting around 2pm today,  Vomited 3 times since then, each time the headache was relieved after vomiting. Pt took Losartan+HCTZ  this morning, as per daughter pt was outside home yesterday for 2 hours in Hot weather, Pt awake, A+O x 3 now, + Urinary frequency, no fever, no dysuria, no CP, No SOB, no cough, no diarrhea, no abdominal pain, no rash, No Visual changes, HEAD CT Unremarkable, HA as well as N/V resolved, elevated BP in the ER, S/P IV Labetalol 10 mg x 1 for /122 which improved, IV Reglan 10 mg x 1 given by ER as well with Help, pt is accompanied with her daughter in the ER tonight, No Dizziness, Feels Better now, + Hyponatremia , Na 126 ,   Allergies:  No Known Allergies      Medications:  ALBUTerol    90 MICROgram(s) HFA Inhaler 2 Puff(s) Inhalation every 6 hours PRN  atorvastatin 10 milliGRAM(s) Oral at bedtime  heparin  Injectable 5000 Unit(s) SubCutaneous every 12 hours  losartan 100 milliGRAM(s) Oral daily  montelukast 10 milliGRAM(s) Oral daily  ondansetron Injectable 4 milliGRAM(s) IV Push every 8 hours PRN  pantoprazole    Tablet 40 milliGRAM(s) Oral before breakfast  sodium chloride 0.9%. 600 milliLiter(s) IV Continuous <Continuous>      PMHX/PSHX:  Allergy  Depression  HTN (hypertension)  Hypercholesterolemia  Asthma  S/P hernia surgery  No significant past surgical history      Family history:  No pertinent family history in first degree relatives      Social History: no toxic habits     ROS:     General:  No wt loss, fevers, chills, night sweats, fatigue,   Eyes:  Good vision, no reported pain  ENT:  No sore throat, pain, runny nose, dysphagia  CV:  No pain, palpitations, hypo/hypertension  Resp:  No dyspnea, cough, tachypnea, wheezing  GI:  No pain, + nausea, + vomiting, No diarrhea, No constipation, No weight loss, No fever, No pruritis, No rectal bleeding, No tarry stools, No dysphagia,  :  No pain, bleeding, incontinence, nocturia  Muscle:  No pain, weakness  Neuro:  No weakness, tingling, memory problems  Psych:  No fatigue, insomnia, mood problems, depression  Endocrine:  No polyuria, polydipsia, cold/heat intolerance  Heme:  No petechiae, ecchymosis, easy bruisability  Skin:  No rash, tattoos, scars, edema      PHYSICAL EXAM:   Vital Signs:  Vital Signs Last 24 Hrs  T(C): 36.7 (07 Sep 2018 13:41), Max: 36.7 (06 Sep 2018 18:59)  T(F): 98.1 (07 Sep 2018 13:41), Max: 98.1 (07 Sep 2018 13:41)  HR: 60 (07 Sep 2018 13:41) (57 - 75)  BP: 138/72 (07 Sep 2018 13:41) (128/62 - 212/85)  BP(mean): --  RR: 18 (07 Sep 2018 13:41) (18 - 18)  SpO2: 99% (07 Sep 2018 13:41) (95% - 99%)  Daily Height in cm: 152.4 (07 Sep 2018 04:11)    Daily     GENERAL:  Appears stated age, well-groomed, well-nourished, no distress  HEENT:  NC/AT,  conjunctivae clear and pink, no thyromegaly, nodules, adenopathy, no JVD, sclera -anicteric  CHEST:  Full & symmetric excursion, no increased effort, breath sounds clear  HEART:  Regular rhythm, S1, S2, no murmur/rub/S3/S4, no abdominal bruit, no edema  ABDOMEN:  Soft, non-tender, non-distended,  EXTEREMITIES:  no cyanosis,clubbing or edema  SKIN:  No rash/erythema/ecchymoses/petechiae/wounds/abscess/warm/dry  NEURO:  Alert, oriented, no asterixis, no tremor, no encephalopathy    LABS:                        11.4   5.03  )-----------( 152      ( 07 Sep 2018 07:40 )             33.7         126<L>  |  89<L>  |  16  ----------------------------<  124<H>  4.4   |  22  |  0.99    Ca    8.8      07 Sep 2018 14:35    TPro  7.2  /  Alb  4.1  /  TBili  1.0  /  DBili  x   /  AST  21  /  ALT  15  /  AlkPhos  56      LIVER FUNCTIONS - ( 06 Sep 2018 19:30 )  Alb: 4.1 g/dL / Pro: 7.2 g/dL / ALK PHOS: 56 u/L / ALT: 15 u/L / AST: 21 u/L / GGT: x           PT/INR - ( 07 Sep 2018 07:40 )   PT: 11.3 SEC;   INR: 0.98          PTT - ( 07 Sep 2018 07:40 )  PTT:26.4 SEC  Urinalysis Basic - ( 06 Sep 2018 22:50 )    Color: LIGHT YELLOW / Appearance: CLEAR / S.013 / pH: 6.5  Gluc: NEGATIVE / Ketone: TRACE  / Bili: NEGATIVE / Urobili: NORMAL   Blood: NEGATIVE / Protein: NEGATIVE / Nitrite: NEGATIVE   Leuk Esterase: SMALL / RBC: 3-5 / WBC 3-5   Sq Epi: OCC / Non Sq Epi: x / Bacteria: NEGATIVE          Imaging:

## 2018-09-07 NOTE — DISCHARGE NOTE ADULT - CARE PLAN
Principal Discharge DX:	Hyponatremia  Goal:	Resolved  Assessment and plan of treatment:	Notify your PMD for any vomiting or diarrhea - follow with Dr Lara (Gastroenterology) within 2 weeks of discharge.  Continue your current medication regime  Follow up labs - Sodium level in one week. Follow up with your PMD.  Secondary Diagnosis:	HTN (hypertension)  Assessment and plan of treatment:	Continue Losartin as recommended  Secondary Diagnosis:	Hypercholesterolemia  Assessment and plan of treatment:	Continue Lipitor

## 2018-09-07 NOTE — CONSULT NOTE ADULT - ASSESSMENT
77 y/o female HX of HTN, High Cholesterol, Depression, Allergy,  asthma, on HCTZ+Losartan for HTN and Cymbalta for depression,  presenting with elevated BP, severe headache, nausea, Vomiting found to have hyponatremia.

## 2018-09-07 NOTE — DISCHARGE NOTE ADULT - HOSPITAL COURSE
79 y/o female HX of HTN, High Cholesterol, Depression, Allergy,  asthma, on HCTZ+Losartan for HTN and Cymbalta for depression,   presenting with elevated BP, nausea, Vomiting  and headache starting around 2pm today,  Vomited 3 times since then, each time the headache was relieved after vomiting.     Hospital Course:     Hyponatremia likely due to vomiting and diuretics:    - renal consluted   - HCTZ on hold   - trend BMP   - IVF's as per renal    HTN:   - cardiology consulted   - c/w Cozaar   - Hold HCTZ    Depression:   - hold cymbalta due to Hyponatremia and dehydration,

## 2018-09-07 NOTE — DISCHARGE NOTE ADULT - PATIENT PORTAL LINK FT
You can access the [facility] Patient Portal, offered by [facility], by registering with the following website: [email or web address]

## 2018-09-07 NOTE — PHYSICAL THERAPY INITIAL EVALUATION ADULT - CRITERIA FOR SKILLED THERAPEUTIC INTERVENTIONS
predicted duration of therapy intervention/therapy frequency/impairments found/anticipated discharge recommendation/Home --> no skilled PT needs for discharge with resumption of HHA services

## 2018-09-07 NOTE — PHYSICAL THERAPY INITIAL EVALUATION ADULT - DISCHARGE DISPOSITION, PT EVAL
home w/ assist/home/no skilled PT needs/resume HHA services (8 hours x 6 days); pt. will benefit from skilled PT while inpatient at [facility]

## 2018-09-08 LAB
ALBUMIN SERPL ELPH-MCNC: 3.7 G/DL — SIGNIFICANT CHANGE UP (ref 3.3–5)
ALP SERPL-CCNC: 46 U/L — SIGNIFICANT CHANGE UP (ref 40–120)
ALT FLD-CCNC: 12 U/L — SIGNIFICANT CHANGE UP (ref 4–33)
AST SERPL-CCNC: 19 U/L — SIGNIFICANT CHANGE UP (ref 4–32)
BASOPHILS # BLD AUTO: 0.05 K/UL — SIGNIFICANT CHANGE UP (ref 0–0.2)
BASOPHILS NFR BLD AUTO: 0.8 % — SIGNIFICANT CHANGE UP (ref 0–2)
BILIRUB SERPL-MCNC: 0.6 MG/DL — SIGNIFICANT CHANGE UP (ref 0.2–1.2)
BUN SERPL-MCNC: 16 MG/DL — SIGNIFICANT CHANGE UP (ref 7–23)
BUN SERPL-MCNC: 18 MG/DL — SIGNIFICANT CHANGE UP (ref 7–23)
CALCIUM SERPL-MCNC: 8.8 MG/DL — SIGNIFICANT CHANGE UP (ref 8.4–10.5)
CALCIUM SERPL-MCNC: 8.9 MG/DL — SIGNIFICANT CHANGE UP (ref 8.4–10.5)
CHLORIDE SERPL-SCNC: 98 MMOL/L — SIGNIFICANT CHANGE UP (ref 98–107)
CHLORIDE SERPL-SCNC: 99 MMOL/L — SIGNIFICANT CHANGE UP (ref 98–107)
CHLORIDE UR-SCNC: < 20 MMOL/L — SIGNIFICANT CHANGE UP
CHOLEST SERPL-MCNC: 135 MG/DL — SIGNIFICANT CHANGE UP (ref 120–199)
CO2 SERPL-SCNC: 22 MMOL/L — SIGNIFICANT CHANGE UP (ref 22–31)
CO2 SERPL-SCNC: 26 MMOL/L — SIGNIFICANT CHANGE UP (ref 22–31)
CORTIS SERPL-MCNC: 15.2 UG/DL — SIGNIFICANT CHANGE UP (ref 2.7–18.4)
CREAT SERPL-MCNC: 0.93 MG/DL — SIGNIFICANT CHANGE UP (ref 0.5–1.3)
CREAT SERPL-MCNC: 1.01 MG/DL — SIGNIFICANT CHANGE UP (ref 0.5–1.3)
EOSINOPHIL # BLD AUTO: 0.05 K/UL — SIGNIFICANT CHANGE UP (ref 0–0.5)
EOSINOPHIL NFR BLD AUTO: 0.8 % — SIGNIFICANT CHANGE UP (ref 0–6)
FERRITIN SERPL-MCNC: 136.7 NG/ML — SIGNIFICANT CHANGE UP (ref 15–150)
FOLATE SERPL-MCNC: 11.3 NG/ML — SIGNIFICANT CHANGE UP (ref 4.7–20)
GLUCOSE SERPL-MCNC: 93 MG/DL — SIGNIFICANT CHANGE UP (ref 70–99)
GLUCOSE SERPL-MCNC: 98 MG/DL — SIGNIFICANT CHANGE UP (ref 70–99)
HCT VFR BLD CALC: 34.2 % — LOW (ref 34.5–45)
HDLC SERPL-MCNC: 65 MG/DL — SIGNIFICANT CHANGE UP (ref 45–65)
HGB BLD-MCNC: 11.5 G/DL — SIGNIFICANT CHANGE UP (ref 11.5–15.5)
IMM GRANULOCYTES # BLD AUTO: 0.03 # — SIGNIFICANT CHANGE UP
IMM GRANULOCYTES NFR BLD AUTO: 0.5 % — SIGNIFICANT CHANGE UP (ref 0–1.5)
IRON SATN MFR SERPL: 252 UG/DL — SIGNIFICANT CHANGE UP (ref 140–530)
IRON SATN MFR SERPL: 42 UG/DL — SIGNIFICANT CHANGE UP (ref 30–160)
LIPID PNL WITH DIRECT LDL SERPL: 66 MG/DL — SIGNIFICANT CHANGE UP
LYMPHOCYTES # BLD AUTO: 1.55 K/UL — SIGNIFICANT CHANGE UP (ref 1–3.3)
LYMPHOCYTES # BLD AUTO: 24.6 % — SIGNIFICANT CHANGE UP (ref 13–44)
MAGNESIUM SERPL-MCNC: 2.1 MG/DL — SIGNIFICANT CHANGE UP (ref 1.6–2.6)
MCHC RBC-ENTMCNC: 29.7 PG — SIGNIFICANT CHANGE UP (ref 27–34)
MCHC RBC-ENTMCNC: 33.6 % — SIGNIFICANT CHANGE UP (ref 32–36)
MCV RBC AUTO: 88.4 FL — SIGNIFICANT CHANGE UP (ref 80–100)
MONOCYTES # BLD AUTO: 0.68 K/UL — SIGNIFICANT CHANGE UP (ref 0–0.9)
MONOCYTES NFR BLD AUTO: 10.8 % — SIGNIFICANT CHANGE UP (ref 2–14)
NEUTROPHILS # BLD AUTO: 3.94 K/UL — SIGNIFICANT CHANGE UP (ref 1.8–7.4)
NEUTROPHILS NFR BLD AUTO: 62.5 % — SIGNIFICANT CHANGE UP (ref 43–77)
NRBC # FLD: 0 — SIGNIFICANT CHANGE UP
PHOSPHATE SERPL-MCNC: 2.9 MG/DL — SIGNIFICANT CHANGE UP (ref 2.5–4.5)
PLATELET # BLD AUTO: 160 K/UL — SIGNIFICANT CHANGE UP (ref 150–400)
PMV BLD: 11.3 FL — SIGNIFICANT CHANGE UP (ref 7–13)
POTASSIUM SERPL-MCNC: 4.4 MMOL/L — SIGNIFICANT CHANGE UP (ref 3.5–5.3)
POTASSIUM SERPL-MCNC: 4.8 MMOL/L — SIGNIFICANT CHANGE UP (ref 3.5–5.3)
POTASSIUM SERPL-SCNC: 4.4 MMOL/L — SIGNIFICANT CHANGE UP (ref 3.5–5.3)
POTASSIUM SERPL-SCNC: 4.8 MMOL/L — SIGNIFICANT CHANGE UP (ref 3.5–5.3)
POTASSIUM UR-SCNC: 9 MMOL/L — SIGNIFICANT CHANGE UP
PROT SERPL-MCNC: 6.7 G/DL — SIGNIFICANT CHANGE UP (ref 6–8.3)
RBC # BLD: 3.87 M/UL — SIGNIFICANT CHANGE UP (ref 3.8–5.2)
RBC # FLD: 12.4 % — SIGNIFICANT CHANGE UP (ref 10.3–14.5)
SODIUM SERPL-SCNC: 132 MMOL/L — LOW (ref 135–145)
SODIUM SERPL-SCNC: 134 MMOL/L — LOW (ref 135–145)
SODIUM UR-SCNC: 24 MMOL/L — SIGNIFICANT CHANGE UP
T4 FREE SERPL-MCNC: 1.37 NG/DL — SIGNIFICANT CHANGE UP (ref 0.9–1.8)
TRIGL SERPL-MCNC: 74 MG/DL — SIGNIFICANT CHANGE UP (ref 10–149)
UIBC SERPL-MCNC: 210 UG/DL — SIGNIFICANT CHANGE UP (ref 110–370)
WBC # BLD: 6.3 K/UL — SIGNIFICANT CHANGE UP (ref 3.8–10.5)
WBC # FLD AUTO: 6.3 K/UL — SIGNIFICANT CHANGE UP (ref 3.8–10.5)

## 2018-09-08 RX ORDER — SODIUM CHLORIDE 9 MG/ML
600 INJECTION INTRAMUSCULAR; INTRAVENOUS; SUBCUTANEOUS
Qty: 0 | Refills: 0 | Status: DISCONTINUED | OUTPATIENT
Start: 2018-09-08 | End: 2018-09-09

## 2018-09-08 RX ADMIN — PANTOPRAZOLE SODIUM 40 MILLIGRAM(S): 20 TABLET, DELAYED RELEASE ORAL at 06:01

## 2018-09-08 RX ADMIN — MONTELUKAST 10 MILLIGRAM(S): 4 TABLET, CHEWABLE ORAL at 18:01

## 2018-09-08 RX ADMIN — ATORVASTATIN CALCIUM 10 MILLIGRAM(S): 80 TABLET, FILM COATED ORAL at 21:05

## 2018-09-08 RX ADMIN — HEPARIN SODIUM 5000 UNIT(S): 5000 INJECTION INTRAVENOUS; SUBCUTANEOUS at 18:01

## 2018-09-08 RX ADMIN — HEPARIN SODIUM 5000 UNIT(S): 5000 INJECTION INTRAVENOUS; SUBCUTANEOUS at 05:50

## 2018-09-08 RX ADMIN — SODIUM CHLORIDE 75 MILLILITER(S): 9 INJECTION INTRAMUSCULAR; INTRAVENOUS; SUBCUTANEOUS at 12:28

## 2018-09-08 RX ADMIN — LOSARTAN POTASSIUM 100 MILLIGRAM(S): 100 TABLET, FILM COATED ORAL at 05:50

## 2018-09-08 NOTE — PROGRESS NOTE ADULT - ATTENDING COMMENTS
Patient seen and examined, agree with above assessment and plan as transcribed above.    - Na improving off HCTZ    Shady De La Cruz MD, FACC
Advanced care planning was discussed with patient and family.  Advanced care planning forms were reviewed and discussed.  Risks, benefits and alternatives of gastroenterologic procedures were discussed in detail and all questions were answered.    30 minutes spent.

## 2018-09-08 NOTE — PROGRESS NOTE ADULT - SUBJECTIVE AND OBJECTIVE BOX
INTERVAL HPI/OVERNIGHT EVENTS:    denies n/v/d/c, abdominal pain, melena or brbpr   no further nausea or vomiting      MEDICATIONS  (STANDING):  atorvastatin 10 milliGRAM(s) Oral at bedtime  heparin  Injectable 5000 Unit(s) SubCutaneous every 12 hours  losartan 100 milliGRAM(s) Oral daily  montelukast 10 milliGRAM(s) Oral daily  pantoprazole    Tablet 40 milliGRAM(s) Oral before breakfast  sodium chloride 0.9%. 600 milliLiter(s) (75 mL/Hr) IV Continuous <Continuous>    MEDICATIONS  (PRN):  ALBUTerol    90 MICROgram(s) HFA Inhaler 2 Puff(s) Inhalation every 6 hours PRN Wheezing  ondansetron Injectable 4 milliGRAM(s) IV Push every 8 hours PRN Nausea and/or Vomiting      Allergies    No Known Allergies    Intolerances        Review of Systems:    General:  No wt loss, fevers, chills, night sweats, fatigue   Eyes:  Good vision, no reported pain  ENT:  No sore throat, pain, runny nose, dysphagia  CV:  No pain, palpitations, hypo/hypertension  Resp:  No dyspnea, cough, tachypnea, wheezing  GI:  No pain, No nausea, No vomiting, No diarrhea, No constipation, No weight loss, No fever, No pruritis, No rectal bleeding, No melena, No dysphagia  :  No pain, bleeding, incontinence, nocturia  Muscle:  No pain, weakness  Neuro:  No weakness, tingling, memory problems  Psych:  No fatigue, insomnia, mood problems, depression  Endocrine:  No polyuria, polydypsia, cold/heat intolerance  Heme:  No petechiae, ecchymosis, easy bruisability  Skin:  No rash, tattoos, scars, edema      Vital Signs Last 24 Hrs  T(C): 36.7 (08 Sep 2018 05:37), Max: 36.8 (07 Sep 2018 18:26)  T(F): 98 (08 Sep 2018 05:37), Max: 98.2 (07 Sep 2018 18:26)  HR: 65 (08 Sep 2018 05:37) (60 - 70)  BP: 138/69 (08 Sep 2018 05:37) (122/63 - 154/66)  BP(mean): --  RR: 17 (08 Sep 2018 05:37) (17 - 18)  SpO2: 100% (08 Sep 2018 05:37) (95% - 100%)    PHYSICAL EXAM:    Constitutional: NAD  HEENT: EOMI, throat clear  Neck: No LAD, supple  Respiratory: CTA and P  Cardiovascular: S1 and S2, RRR, no M  Gastrointestinal: BS+, soft, NT/ND, neg HSM,  Extremities: No peripheral edema, neg clubbing, cyanosis  Vascular: 2+ peripheral pulses  Neurological: A/O x 3, no focal deficits  Psychiatric: Normal mood, normal affect  Skin: No rashes      LABS:                        11.5   6.30  )-----------( 160      ( 08 Sep 2018 06:47 )             34.2     -    132<L>  |  99  |  18  ----------------------------<  98  4.8   |  22  |  0.93    Ca    8.8      08 Sep 2018 06:47  Phos  2.9     -  Mg     2.1     -08    TPro  6.7  /  Alb  3.7  /  TBili  0.6  /  DBili  x   /  AST  19  /  ALT  12  /  AlkPhos  46  09-08    PT/INR - ( 07 Sep 2018 07:40 )   PT: 11.3 SEC;   INR: 0.98          PTT - ( 07 Sep 2018 07:40 )  PTT:26.4 SEC  Urinalysis Basic - ( 06 Sep 2018 22:50 )    Color: LIGHT YELLOW / Appearance: CLEAR / S.013 / pH: 6.5  Gluc: NEGATIVE / Ketone: TRACE  / Bili: NEGATIVE / Urobili: NORMAL   Blood: NEGATIVE / Protein: NEGATIVE / Nitrite: NEGATIVE   Leuk Esterase: SMALL / RBC: 3-5 / WBC 3-5   Sq Epi: OCC / Non Sq Epi: x / Bacteria: NEGATIVE        RADIOLOGY & ADDITIONAL TESTS:

## 2018-09-08 NOTE — PROGRESS NOTE ADULT - SUBJECTIVE AND OBJECTIVE BOX
SUBJECTIVE: No CP, SOB, Palps, ABdominal pain    MEDICATIONS  (STANDING):  atorvastatin 10 milliGRAM(s) Oral at bedtime  heparin  Injectable 5000 Unit(s) SubCutaneous every 12 hours  losartan 100 milliGRAM(s) Oral daily  montelukast 10 milliGRAM(s) Oral daily  pantoprazole    Tablet 40 milliGRAM(s) Oral before breakfast  sodium chloride 0.9%. 600 milliLiter(s) (75 mL/Hr) IV Continuous <Continuous>    MEDICATIONS  (PRN):  ALBUTerol    90 MICROgram(s) HFA Inhaler 2 Puff(s) Inhalation every 6 hours PRN Wheezing  ondansetron Injectable 4 milliGRAM(s) IV Push every 8 hours PRN Nausea and/or Vomiting      LABS:                        11.5   6.30  )-----------( 160      ( 08 Sep 2018 06:47 )             34.2     132<L>  |  99  |  18  ----------------------------<  98  4.8   |  22  |  0.93    Ca    8.8      08 Sep 2018 06:47  Phos  2.9     09-08  Mg     2.1     09-08    TPro  6.7  /  Alb  3.7  /  TBili  0.6  /  DBili  x   /  AST  19  /  ALT  12  /  AlkPhos  46  09-08    PHYSICAL EXAM:  Vital Signs Last 24 Hrs  T(C): 36.7 (08 Sep 2018 05:37), Max: 36.8 (07 Sep 2018 18:26)  T(F): 98 (08 Sep 2018 05:37), Max: 98.2 (07 Sep 2018 18:26)  HR: 65 (08 Sep 2018 05:37) (60 - 70)  BP: 138/69 (08 Sep 2018 05:37) (122/63 - 154/66)  RR: 17 (08 Sep 2018 05:37) (17 - 18)  SpO2: 100% (08 Sep 2018 05:37) (95% - 100%)    Cardiovascular:  S1S2 RRR, No JVD  Respiratory: Lungs clear to auscultation, normal effort  Gastrointestinal: Abdomen soft, ND, NT, +BS  Skin: Warm, dry, intact. No rash.  Musculoskeletal: Normal ROM, normal strength  Ext: No C/C/E B/L LE    DIAGNOSTIC DATA    < from: CT Head No Cont (09.06.18 @ 20:06) >    IMPRESSION:   No CT evidence of acute intracranial hemorrhage, brain edema, mass effect   or acute territorial infarct. 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 No change since 12/11/2017.    < end of copied text >      ASSESSMENT AND PLAN:  77 y/o female HX of HTN, High Cholesterol, CVA (no residual) Depression,  asthma,  presenting with elevated BP (197/123), nausea, Vomiting  and headache starting around 2pm yesterday. The patient states her symptoms started acutely and were not associated with any anginal symptoms or syncope or palpitations. Her headache and nausea were relieved by vomiting which occurred 3 times.  In the Ed, she was found to have HTN urgency with hyponatremia. She denies any h/o CAD/MI and states that she has no anginal symptoms with about 4 mets of exercise tolerance. She denies prior cardiac testing.     --EKG with NSR narrow QRS  -- HTN - BP improved with home dose Cozaar 100mg and 1 time dose of Labetalol 10mg IVP given 9/6             - home dose HCTZ (12.5) held given hyponatremia  -- Hyponatremia improving  -- HD stable no evidence CHF  -- could check TTE but could likely be done as outpt  --?add Asa 79 given HLD/prior CVA      Sienna Jones PA-C

## 2018-09-08 NOTE — PROGRESS NOTE ADULT - PROBLEM SELECTOR PLAN 1
Pt with acute hyponatremia likely hypovolumic hyponatremia in setting of vomiting, and diuretic use.  initial work up suggested SIADH however patient improved with NS, repeat urine Na also showed low Na   Will give NS @ 75 x12 hrs today, repeat blood work at 6  monitor bmp, Na should not be corrected >8meq in 24 hrs Pt with acute hyponatremia likely hypovolumic hyponatremia in setting of vomiting, and diuretic use.  initial work up suggested SIADH however patient improved with NS, repeat urine Na also showed low Na, suggestive of dehydration  Will give NS @ 75 x12 hrs today, repeat blood work at 6  monitor bmp, Na should not be corrected >8meq in 24 hrs

## 2018-09-08 NOTE — PROGRESS NOTE ADULT - PROBLEM SELECTOR PLAN 1
- likely in the setting of hypertensive urgency and hyponatermia  - now resolved  - zofran prn   - will continue to monitor  - no urgent gi work up necessary

## 2018-09-08 NOTE — PROGRESS NOTE ADULT - SUBJECTIVE AND OBJECTIVE BOX
[facility]   MD MICHAEL REVELES MD ANGELA WONG, PA    TEL:  OFFICE: [phone]  DR MAYER CELL: [phone]  DR. RAY CELL: [phone]  PHUC ZARCO CELL: [phone]        Patient is a 78y old  Female who presents with a chief complaint of HA, + N/V, Dehydration, Elevated Blood Pressure (07 Sep 2018 16:01)      Patient seen and examined at bedside. No chest pain/sob    VITALS:  T(F): 98 (09-08-18 @ 05:37), Max: 98.2 (09-07-18 @ 18:26)  HR: 65 (09-08-18 @ 05:37)  BP: 138/69 (09-08-18 @ 05:37)  RR: 17 (09-08-18 @ 05:37)  SpO2: 100% (09-08-18 @ 05:37)  Wt(kg): --        PHYSICAL EXAM:  Constitutional: NAD  Neck: No JVD  Respiratory: CTAB, no wheezes, rales or rhonchi  Cardiovascular: S1, S2, RRR  Gastrointestinal: BS+, soft, NT/ND  Extremities: No peripheral edema    Hospital Medications:   MEDICATIONS  (STANDING):  atorvastatin 10 milliGRAM(s) Oral at bedtime  heparin  Injectable 5000 Unit(s) SubCutaneous every 12 hours  losartan 100 milliGRAM(s) Oral daily  montelukast 10 milliGRAM(s) Oral daily  pantoprazole    Tablet 40 milliGRAM(s) Oral before breakfast  sodium chloride 0.9%. 600 milliLiter(s) (75 mL/Hr) IV Continuous <Continuous>      LABS:  09-08    132<L>  |  99  |  18  ----------------------------<  98  4.8   |  22  |  0.93    Ca    8.8      08 Sep 2018 06:47  Phos  2.9     09-08  Mg     2.1     09-08    TPro  6.7  /  Alb  3.7  /  TBili  0.6  /  DBili      /  AST  19  /  ALT  12  /  AlkPhos  46  09-08    Creatinine Trend: 0.93 <--, 1.01 <--, 0.99 <--, 0.89 <--    Phosphorus Level, Serum: 2.9 mg/dL (09-08 @ 06:47)  Albumin, Serum: 3.7 g/dL (09-08 @ 06:47)  Iron Total, Serum: 42 ug/dL (09-08 @ 06:47)  Ferritin, Serum: 136.7 ng/mL (09-08 @ 06:47)                              11.5   6.30  )-----------( 160      ( 08 Sep 2018 06:47 )             34.2     Urine Studies:  Urinalysis - [09-06-18 @ 22:50]      Color LIGHT YELLOW / Appearance CLEAR / SG 1.013 / pH 6.5      Gluc NEGATIVE / Ketone TRACE  / Bili NEGATIVE / Urobili NORMAL       Blood NEGATIVE / Protein NEGATIVE / Leuk Est SMALL / Nitrite NEGATIVE      RBC 3-5 / WBC 3-5 / Hyaline NEGATIVE / Gran  / Sq Epi OCC / Non Sq Epi  / Bacteria NEGATIVE    Urine Creatinine 52.10      [09-06-18 @ 22:50]  Urine Sodium 24      [09-08-18 @ 06:00]  Urine Potassium 9.0      [09-08-18 @ 06:00]  Urine Chloride < 20      [09-08-18 @ 06:00]  Urine Osmolality 422      [09-06-18 @ 22:50]    Iron 42, TIBC 252, %sat --      [09-08-18 @ 06:47]  Ferritin 136.7      [09-08-18 @ 06:47]  HbA1c 5.5      [09-07-18 @ 07:40]  TSH 2.36      [09-07-18 @ 14:35]  Lipid: chol 135, TG 74, HDL 65, LDL 66      [09-08-18 @ 06:47]    HBsAg Nonreactive      [09-07-18 @ 07:40]  HCV 0.13, Nonreactive Hepatitis C AB  S/CO Ratio                        Interpretation  < 1.0                                     Non-Reactive  1.0 - 4.9                           Weakly-Reactive  > 5.0                                 Reactive  Non-Reactive: Aperson with a non-reactive HCV antibody  result is considered uninfected.  No further action is  needed unless recent infection is suspected.  In these  cases, consider repeat testing later to detect  seroconversion..  Weakly-Reactive: HCV antibody test is abnormal, HCV RNA  Qualitative test will follow.  Reactive: HCV antibody test is abnormal, HCV RNA  Qualitative test will follow.  Note: HCV antibody testing is performed on the [facility] system.      [09-07-18 @ 07:40]      RADIOLOGY & ADDITIONAL STUDIES:

## 2018-09-08 NOTE — PROGRESS NOTE ADULT - ASSESSMENT
Problem/Plan - 1:  ·  Problem: Hyponatremia.  Plan: Most likely due to Dehydration, Heat, on HCTZ and Cymbalta,   management as per renal  Problem/Plan - 2:  ·  Problem: HTN (hypertension).  Plan: cw home meds  will monitor     Problem/Plan - 3:  ·  Problem: Hypercholesterolemia.  Plan: cw lipitor    Problem/Plan - 4:  ·  Problem: Depression.  Plan: Hold Cymbalta due to Hyponatremia and dehydration,    Problem/Plan - 5:  ·  Problem: Asthma.  Plan: Stable, on Singulair, Ventolin  PRN,     dc planning in am if stable

## 2018-09-08 NOTE — PROGRESS NOTE ADULT - SUBJECTIVE AND OBJECTIVE BOX
Patient is a 78y old  Female who presents with a chief complaint of HA, + N/V, Dehydration, Elevated Blood Pressure (08 Sep 2018 12:24)      INTERVAL HPI/OVERNIGHT EVENTS:  T(C): 36.7 (18 @ 18:15), Max: 36.8 (18 @ 01:06)  HR: 63 (18 @ 18:15) (56 - 70)  BP: 131/72 (18 @ 18:15) (122/63 - 150/66)  RR: 18 (18 @ 18:15) (17 - 18)  SpO2: 99% (18 @ 18:15) (95% - 100%)  Wt(kg): --  I&O's Summary      LABS:                        11.5   6.30  )-----------( 160      ( 08 Sep 2018 06:47 )             34.2     09-08    132<L>  |  99  |  18  ----------------------------<  98  4.8   |  22  |  0.93    Ca    8.8      08 Sep 2018 06:47  Phos  2.9     09-08  Mg     2.1     -08    TPro  6.7  /  Alb  3.7  /  TBili  0.6  /  DBili  x   /  AST  19  /  ALT  12  /  AlkPhos  46  09-08    PT/INR - ( 07 Sep 2018 07:40 )   PT: 11.3 SEC;   INR: 0.98          PTT - ( 07 Sep 2018 07:40 )  PTT:26.4 SEC  Urinalysis Basic - ( 06 Sep 2018 22:50 )    Color: LIGHT YELLOW / Appearance: CLEAR / S.013 / pH: 6.5  Gluc: NEGATIVE / Ketone: TRACE  / Bili: NEGATIVE / Urobili: NORMAL   Blood: NEGATIVE / Protein: NEGATIVE / Nitrite: NEGATIVE   Leuk Esterase: SMALL / RBC: 3-5 / WBC 3-5   Sq Epi: OCC / Non Sq Epi: x / Bacteria: NEGATIVE      CAPILLARY BLOOD GLUCOSE            Urinalysis Basic - ( 06 Sep 2018 22:50 )    Color: LIGHT YELLOW / Appearance: CLEAR / S.013 / pH: 6.5  Gluc: NEGATIVE / Ketone: TRACE  / Bili: NEGATIVE / Urobili: NORMAL   Blood: NEGATIVE / Protein: NEGATIVE / Nitrite: NEGATIVE   Leuk Esterase: SMALL / RBC: 3-5 / WBC 3-5   Sq Epi: OCC / Non Sq Epi: x / Bacteria: NEGATIVE        MEDICATIONS  (STANDING):  atorvastatin 10 milliGRAM(s) Oral at bedtime  heparin  Injectable 5000 Unit(s) SubCutaneous every 12 hours  losartan 100 milliGRAM(s) Oral daily  montelukast 10 milliGRAM(s) Oral daily  pantoprazole    Tablet 40 milliGRAM(s) Oral before breakfast  sodium chloride 0.9%. 600 milliLiter(s) (75 mL/Hr) IV Continuous <Continuous>    MEDICATIONS  (PRN):  ALBUTerol    90 MICROgram(s) HFA Inhaler 2 Puff(s) Inhalation every 6 hours PRN Wheezing  ondansetron Injectable 4 milliGRAM(s) IV Push every 8 hours PRN Nausea and/or Vomiting          PHYSICAL EXAM:  GENERAL: NAD, well-groomed, well-developed  HEAD:  Atraumatic, Normocephalic  CHEST/LUNG: Clear to percussion bilaterally; No rales, rhonchi, wheezing, or rubs  HEART: Regular rate and rhythm; No murmurs, rubs, or gallops  ABDOMEN: Soft, Nontender, Nondistended; Bowel sounds present  EXTREMITIES:  2+ Peripheral Pulses, No clubbing, cyanosis, or edema  LYMPH: No lymphadenopathy noted  SKIN: No rashes or lesions    Care Discussed with Consultants/Other Providers [ ] YES  [ ] NO

## 2018-09-08 NOTE — PROGRESS NOTE ADULT - ASSESSMENT
79 y/o female HX of HTN, High Cholesterol, Depression, Allergy,  asthma, on HCTZ+Losartan for HTN and Cymbalta for depression,  presenting with elevated BP, severe headache, nausea, Vomiting found to have hyponatremia.

## 2018-09-09 VITALS
HEART RATE: 65 BPM | OXYGEN SATURATION: 99 % | DIASTOLIC BLOOD PRESSURE: 54 MMHG | SYSTOLIC BLOOD PRESSURE: 129 MMHG | TEMPERATURE: 97 F | RESPIRATION RATE: 18 BRPM

## 2018-09-09 LAB
ALBUMIN SERPL ELPH-MCNC: 3.3 G/DL — SIGNIFICANT CHANGE UP (ref 3.3–5)
ALP SERPL-CCNC: 40 U/L — SIGNIFICANT CHANGE UP (ref 40–120)
ALT FLD-CCNC: 10 U/L — SIGNIFICANT CHANGE UP (ref 4–33)
AST SERPL-CCNC: 16 U/L — SIGNIFICANT CHANGE UP (ref 4–32)
BACTERIA UR CULT: SIGNIFICANT CHANGE UP
BASOPHILS # BLD AUTO: 0.04 K/UL — SIGNIFICANT CHANGE UP (ref 0–0.2)
BASOPHILS NFR BLD AUTO: 0.7 % — SIGNIFICANT CHANGE UP (ref 0–2)
BILIRUB SERPL-MCNC: 0.6 MG/DL — SIGNIFICANT CHANGE UP (ref 0.2–1.2)
BUN SERPL-MCNC: 19 MG/DL — SIGNIFICANT CHANGE UP (ref 7–23)
CALCIUM SERPL-MCNC: 8.5 MG/DL — SIGNIFICANT CHANGE UP (ref 8.4–10.5)
CHLORIDE SERPL-SCNC: 103 MMOL/L — SIGNIFICANT CHANGE UP (ref 98–107)
CO2 SERPL-SCNC: 25 MMOL/L — SIGNIFICANT CHANGE UP (ref 22–31)
CREAT SERPL-MCNC: 0.94 MG/DL — SIGNIFICANT CHANGE UP (ref 0.5–1.3)
EOSINOPHIL # BLD AUTO: 0.06 K/UL — SIGNIFICANT CHANGE UP (ref 0–0.5)
EOSINOPHIL NFR BLD AUTO: 1.1 % — SIGNIFICANT CHANGE UP (ref 0–6)
GLUCOSE SERPL-MCNC: 92 MG/DL — SIGNIFICANT CHANGE UP (ref 70–99)
HCT VFR BLD CALC: 32.9 % — LOW (ref 34.5–45)
HGB BLD-MCNC: 10.9 G/DL — LOW (ref 11.5–15.5)
IMM GRANULOCYTES # BLD AUTO: 0.01 # — SIGNIFICANT CHANGE UP
IMM GRANULOCYTES NFR BLD AUTO: 0.2 % — SIGNIFICANT CHANGE UP (ref 0–1.5)
LYMPHOCYTES # BLD AUTO: 1.48 K/UL — SIGNIFICANT CHANGE UP (ref 1–3.3)
LYMPHOCYTES # BLD AUTO: 26.5 % — SIGNIFICANT CHANGE UP (ref 13–44)
MAGNESIUM SERPL-MCNC: 2.1 MG/DL — SIGNIFICANT CHANGE UP (ref 1.6–2.6)
MCHC RBC-ENTMCNC: 29.5 PG — SIGNIFICANT CHANGE UP (ref 27–34)
MCHC RBC-ENTMCNC: 33.1 % — SIGNIFICANT CHANGE UP (ref 32–36)
MCV RBC AUTO: 89.2 FL — SIGNIFICANT CHANGE UP (ref 80–100)
MONOCYTES # BLD AUTO: 0.73 K/UL — SIGNIFICANT CHANGE UP (ref 0–0.9)
MONOCYTES NFR BLD AUTO: 13.1 % — SIGNIFICANT CHANGE UP (ref 2–14)
NEUTROPHILS # BLD AUTO: 3.27 K/UL — SIGNIFICANT CHANGE UP (ref 1.8–7.4)
NEUTROPHILS NFR BLD AUTO: 58.4 % — SIGNIFICANT CHANGE UP (ref 43–77)
NRBC # FLD: 0 — SIGNIFICANT CHANGE UP
PHOSPHATE SERPL-MCNC: 2.7 MG/DL — SIGNIFICANT CHANGE UP (ref 2.5–4.5)
PLATELET # BLD AUTO: 149 K/UL — LOW (ref 150–400)
PMV BLD: 11 FL — SIGNIFICANT CHANGE UP (ref 7–13)
POTASSIUM SERPL-MCNC: 4.3 MMOL/L — SIGNIFICANT CHANGE UP (ref 3.5–5.3)
POTASSIUM SERPL-SCNC: 4.3 MMOL/L — SIGNIFICANT CHANGE UP (ref 3.5–5.3)
PROT SERPL-MCNC: 6.1 G/DL — SIGNIFICANT CHANGE UP (ref 6–8.3)
RBC # BLD: 3.69 M/UL — LOW (ref 3.8–5.2)
RBC # FLD: 12.6 % — SIGNIFICANT CHANGE UP (ref 10.3–14.5)
SODIUM SERPL-SCNC: 138 MMOL/L — SIGNIFICANT CHANGE UP (ref 135–145)
SPECIMEN SOURCE: SIGNIFICANT CHANGE UP
WBC # BLD: 5.59 K/UL — SIGNIFICANT CHANGE UP (ref 3.8–10.5)
WBC # FLD AUTO: 5.59 K/UL — SIGNIFICANT CHANGE UP (ref 3.8–10.5)

## 2018-09-09 RX ADMIN — LOSARTAN POTASSIUM 100 MILLIGRAM(S): 100 TABLET, FILM COATED ORAL at 05:04

## 2018-09-09 RX ADMIN — HEPARIN SODIUM 5000 UNIT(S): 5000 INJECTION INTRAVENOUS; SUBCUTANEOUS at 05:04

## 2018-09-09 RX ADMIN — PANTOPRAZOLE SODIUM 40 MILLIGRAM(S): 20 TABLET, DELAYED RELEASE ORAL at 05:04

## 2018-09-09 RX ADMIN — MONTELUKAST 10 MILLIGRAM(S): 4 TABLET, CHEWABLE ORAL at 11:49

## 2018-09-09 NOTE — PROGRESS NOTE ADULT - PROBLEM SELECTOR PLAN 2
- better controlled  - continue management per primary team
- better controlled  - continue management per primary team
BP now controlled with losartan 100mg daily  hold HCTZ  Check renal artery duplex to r/o LISA.
BP now controlled with losartan 100mg daily  hold HCTZ  Check renal artery duplex to r/o LISA.

## 2018-09-09 NOTE — PROGRESS NOTE ADULT - SUBJECTIVE AND OBJECTIVE BOX
Dr. Noel  Office [phone]  Cell [phone]  [facility] [place]  Cell [phone]      Patient is a 78y old  Female who presents with a chief complaint of HA, + N/V, Dehydration, Elevated Blood Pressure (09 Sep 2018 13:31)      Patient seen and examined at bedside. No chest pain/sob    VITALS:  T(F): 97.4 (09-09-18 @ 09:03), Max: 98.5 (09-09-18 @ 05:03)  HR: 65 (09-09-18 @ 09:03)  BP: 129/54 (09-09-18 @ 09:03)  RR: 18 (09-09-18 @ 09:03)  SpO2: 99% (09-09-18 @ 09:03)  Wt(kg): --    09-08 @ 07:01  -  09-09 @ 07:00  --------------------------------------------------------  IN: 200 mL / OUT: 0 mL / NET: 200 mL          PHYSICAL EXAM:  Constitutional: NAD  Neck: No JVD  Respiratory: CTAB, no wheezes, rales or rhonchi  Cardiovascular: S1, S2, RRR  Gastrointestinal: BS+, soft, NT/ND  Extremities: No peripheral edema    Hospital Medications:   MEDICATIONS  (STANDING):  atorvastatin 10 milliGRAM(s) Oral at bedtime  heparin  Injectable 5000 Unit(s) SubCutaneous every 12 hours  losartan 100 milliGRAM(s) Oral daily  montelukast 10 milliGRAM(s) Oral daily  pantoprazole    Tablet 40 milliGRAM(s) Oral before breakfast  sodium chloride 0.9%. 600 milliLiter(s) (75 mL/Hr) IV Continuous <Continuous>      LABS:  09-09    138  |  103  |  19  ----------------------------<  92  4.3   |  25  |  0.94    Ca    8.5      09 Sep 2018 06:12  Phos  2.7     09-09  Mg     2.1     09-09    TPro  6.1  /  Alb  3.3  /  TBili  0.6  /  DBili      /  AST  16  /  ALT  10  /  AlkPhos  40  09-09    Creatinine Trend: 0.94 <--, 0.93 <--, 1.01 <--, 0.99 <--, 0.89 <--    Phosphorus Level, Serum: 2.7 mg/dL (09-09 @ 06:12)  Albumin, Serum: 3.3 g/dL (09-09 @ 06:12)                              10.9   5.59  )-----------( 149      ( 09 Sep 2018 06:12 )             32.9     Urine Studies:  Urinalysis - [09-06-18 @ 22:50]      Color LIGHT YELLOW / Appearance CLEAR / SG 1.013 / pH 6.5      Gluc NEGATIVE / Ketone TRACE  / Bili NEGATIVE / Urobili NORMAL       Blood NEGATIVE / Protein NEGATIVE / Leuk Est SMALL / Nitrite NEGATIVE      RBC 3-5 / WBC 3-5 / Hyaline NEGATIVE / Gran  / Sq Epi OCC / Non Sq Epi  / Bacteria NEGATIVE    Urine Creatinine 52.10      [09-06-18 @ 22:50]  Urine Sodium 24      [09-08-18 @ 06:00]  Urine Potassium 9.0      [09-08-18 @ 06:00]  Urine Chloride < 20      [09-08-18 @ 06:00]  Urine Osmolality 422      [09-06-18 @ 22:50]    Iron 42, TIBC 252, %sat --      [09-08-18 @ 06:47]  Ferritin 136.7      [09-08-18 @ 06:47]  HbA1c 5.5      [09-07-18 @ 07:40]  TSH 2.36      [09-07-18 @ 14:35]  Lipid: chol 135, TG 74, HDL 65, LDL 66      [09-08-18 @ 06:47]    HBsAg Nonreactive      [09-07-18 @ 07:40]  HCV 0.13, Nonreactive Hepatitis C AB  S/CO Ratio                        Interpretation  < 1.0                                     Non-Reactive  1.0 - 4.9                           Weakly-Reactive  > 5.0                                 Reactive  Non-Reactive: Aperson with a non-reactive HCV antibody  result is considered uninfected.  No further action is  needed unless recent infection is suspected.  In these  cases, consider repeat testing later to detect  seroconversion..  Weakly-Reactive: HCV antibody test is abnormal, HCV RNA  Qualitative test will follow.  Reactive: HCV antibody test is abnormal, HCV RNA  Qualitative test will follow.  Note: HCV antibody testing is performed on the [employer]   system.      [09-07-18 @ 07:40]      RADIOLOGY & ADDITIONAL STUDIES:

## 2018-09-09 NOTE — PROGRESS NOTE ADULT - SUBJECTIVE AND OBJECTIVE BOX
INTERVAL HPI/OVERNIGHT EVENTS:    reports bm yesterday, brown  no abdominal pain   no n/v  tolerating PO intake  "i go home?"    MEDICATIONS  (STANDING):  atorvastatin 10 milliGRAM(s) Oral at bedtime  heparin  Injectable 5000 Unit(s) SubCutaneous every 12 hours  losartan 100 milliGRAM(s) Oral daily  montelukast 10 milliGRAM(s) Oral daily  pantoprazole    Tablet 40 milliGRAM(s) Oral before breakfast  sodium chloride 0.9%. 600 milliLiter(s) (75 mL/Hr) IV Continuous <Continuous>    MEDICATIONS  (PRN):  ALBUTerol    90 MICROgram(s) HFA Inhaler 2 Puff(s) Inhalation every 6 hours PRN Wheezing  ondansetron Injectable 4 milliGRAM(s) IV Push every 8 hours PRN Nausea and/or Vomiting      Allergies    No Known Allergies    Intolerances        Review of Systems:    General:  No wt loss, fevers, chills, night sweats, fatigue   Eyes:  Good vision, no reported pain  ENT:  No sore throat, pain, runny nose, dysphagia  CV:  No pain, palpitations, hypo/hypertension  Resp:  No dyspnea, cough, tachypnea, wheezing  GI:  No pain, No nausea, No vomiting, No diarrhea, No constipation, No weight loss, No fever, No pruritis, No rectal bleeding, No melena, No dysphagia  :  No pain, bleeding, incontinence, nocturia  Muscle:  No pain, weakness  Neuro:  No weakness, tingling, memory problems  Psych:  No fatigue, insomnia, mood problems, depression  Endocrine:  No polyuria, polydypsia, cold/heat intolerance  Heme:  No petechiae, ecchymosis, easy bruisability  Skin:  No rash, tattoos, scars, edema      Vital Signs Last 24 Hrs  T(C): 36.3 (09 Sep 2018 09:03), Max: 36.9 (09 Sep 2018 05:03)  T(F): 97.4 (09 Sep 2018 09:03), Max: 98.5 (09 Sep 2018 05:03)  HR: 65 (09 Sep 2018 09:03) (56 - 65)  BP: 129/54 (09 Sep 2018 09:03) (129/54 - 184/79)  BP(mean): --  RR: 18 (09 Sep 2018 09:03) (17 - 18)  SpO2: 99% (09 Sep 2018 09:03) (98% - 100%)    PHYSICAL EXAM:    Constitutional: NAD  HEENT: EOMI, throat clear  Neck: No LAD, supple  Respiratory: CTA and P  Cardiovascular: S1 and S2, RRR, no M  Gastrointestinal: BS+, soft, NT/ND, neg HSM,  Extremities: No peripheral edema, neg clubbing, cyanosis  Vascular: 2+ peripheral pulses  Neurological: A/O x 3, no focal deficits  Psychiatric: Normal mood, normal affect  Skin: No rashes      LABS:                        10.9   5.59  )-----------( 149      ( 09 Sep 2018 06:12 )             32.9     09-09    138  |  103  |  19  ----------------------------<  92  4.3   |  25  |  0.94    Ca    8.5      09 Sep 2018 06:12  Phos  2.7     09-09  Mg     2.1     09-09    TPro  6.1  /  Alb  3.3  /  TBili  0.6  /  DBili  x   /  AST  16  /  ALT  10  /  AlkPhos  40  09-09          RADIOLOGY & ADDITIONAL TESTS:

## 2018-09-09 NOTE — PROGRESS NOTE ADULT - PROBLEM SELECTOR PROBLEM 1
Nausea and vomiting, intractability of vomiting not specified, unspecified vomiting type
Nausea and vomiting, intractability of vomiting not specified, unspecified vomiting type
Hyponatremia
Hyponatremia

## 2018-09-09 NOTE — PROGRESS NOTE ADULT - PROBLEM SELECTOR PLAN 1
Pt with acute hyponatremia likely hypovolumic hyponatremia in setting of vomiting, and diuretic use.  initial work up suggested SIADH however patient improved with NS, repeat urine Na also showed low Na, suggestive of dehydration  Sodium level improved with hydration

## 2018-09-09 NOTE — PROGRESS NOTE ADULT - REASON FOR ADMISSION
HA, + N/V, Dehydration, Elevated Blood Pressure

## 2018-09-09 NOTE — PROGRESS NOTE ADULT - PROBLEM SELECTOR PLAN 1
- likely in the setting of hypertensive urgency and hyponatermia; both improved   - n/v now resolved  - zofran prn   - will continue to monitor  - no further gi work up necessary  - dc planning per primary team

## 2018-09-09 NOTE — PROGRESS NOTE ADULT - SUBJECTIVE AND OBJECTIVE BOX
Patient denies CP, SOB Review of systems otherwise (-)    ALBUTerol    90 MICROgram(s) HFA Inhaler 2 Puff(s) Inhalation every 6 hours PRN  atorvastatin 10 milliGRAM(s) Oral at bedtime  heparin  Injectable 5000 Unit(s) SubCutaneous every 12 hours  losartan 100 milliGRAM(s) Oral daily  montelukast 10 milliGRAM(s) Oral daily  ondansetron Injectable 4 milliGRAM(s) IV Push every 8 hours PRN  pantoprazole    Tablet 40 milliGRAM(s) Oral before breakfast  sodium chloride 0.9%. 600 milliLiter(s) IV Continuous <Continuous>                            10.9   5.59  )-----------( 149      ( 09 Sep 2018 06:12 )             32.9       Hemoglobin: 10.9 g/dL (09-09 @ 06:12)  Hemoglobin: 11.5 g/dL (09-08 @ 06:47)  Hemoglobin: 11.4 g/dL (09-07 @ 07:40)  Hemoglobin: 12.0 g/dL (09-06 @ 19:30)      09-09    138  |  103  |  19  ----------------------------<  92  4.3   |  25  |  0.94    Ca    8.5      09 Sep 2018 06:12  Phos  2.7     09-09  Mg     2.1     09-09    TPro  6.1  /  Alb  3.3  /  TBili  0.6  /  DBili  x   /  AST  16  /  ALT  10  /  AlkPhos  40  09-09    Creatinine Trend: 0.94<--, 0.93<--, 1.01<--, 0.99<--, 0.89<--    COAGS:           T(C): 36.3 (09-09-18 @ 09:03), Max: 36.9 (09-09-18 @ 05:03)  HR: 65 (09-09-18 @ 09:03) (56 - 65)  BP: 129/54 (09-09-18 @ 09:03) (129/54 - 184/79)  RR: 18 (09-09-18 @ 09:03) (17 - 18)  SpO2: 99% (09-09-18 @ 09:03) (98% - 100%)  Wt(kg): --    I&O's Summary    08 Sep 2018 07:01  -  09 Sep 2018 07:00  --------------------------------------------------------  IN: 200 mL / OUT: 0 mL / NET: 200 mL      Cardiovascular:  S1S2 RRR, No JVD  Respiratory: Lungs clear to auscultation, normal effort  Gastrointestinal: Abdomen soft, ND, NT, +BS  Skin: Warm, dry, intact. No rash.  Musculoskeletal: Normal ROM, normal strength  Ext: No C/C/E B/L LE    DIAGNOSTIC DATA    < from: CT Head No Cont (09.06.18 @ 20:06) >    IMPRESSION:   No CT evidence of acute intracranial hemorrhage, brain edema, mass effect   or acute territorial infarct. 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 No change since 12/11/2017.    < end of copied text >      ASSESSMENT AND PLAN:  77 y/o female HX of HTN, High Cholesterol, CVA (no residual) Depression,  asthma,  presenting with elevated BP (197/123), nausea, Vomiting  and headache starting around 2pm yesterday. The patient states her symptoms started acutely and were not associated with any anginal symptoms or syncope or palpitations. Her headache and nausea were relieved by vomiting which occurred 3 times.  In the Ed, she was found to have HTN urgency with hyponatremia. She denies any h/o CAD/MI and states that she has no anginal symptoms with about 4 mets of exercise tolerance. She denies prior cardiac testing.     --EKG with NSR narrow QRS  -- HTN - BP improved with home dose Cozaar 100mg and 1 time dose of Labetalol 10mg IVP given 9/6             - home dose HCTZ (12.5) held given hyponatremia  -- Hyponatremia improving  -- HD stable no evidence CHF  -- could check TTE but could likely be done as outpt        Shady De La Cruz MD, [facility]

## 2018-09-10 LAB — ALDOST SERPL-MCNC: 6.1 NG/DL — SIGNIFICANT CHANGE UP

## 2018-10-07 ENCOUNTER — EMERGENCY (EMERGENCY)
Facility: HOSPITAL | Age: 78
LOS: 1 days | Discharge: HOME | End: 2018-10-07
Attending: EMERGENCY MEDICINE
Payer: MEDICARE

## 2018-10-07 VITALS
HEART RATE: 77 BPM | DIASTOLIC BLOOD PRESSURE: 78 MMHG | SYSTOLIC BLOOD PRESSURE: 167 MMHG | TEMPERATURE: 98 F | RESPIRATION RATE: 16 BRPM | WEIGHT: 141.98 LBS | OXYGEN SATURATION: 99 %

## 2018-10-07 VITALS
OXYGEN SATURATION: 98 % | TEMPERATURE: 99 F | RESPIRATION RATE: 16 BRPM | DIASTOLIC BLOOD PRESSURE: 85 MMHG | HEART RATE: 60 BPM | SYSTOLIC BLOOD PRESSURE: 162 MMHG

## 2018-10-07 DIAGNOSIS — Z98.890 OTHER SPECIFIED POSTPROCEDURAL STATES: Chronic | ICD-10-CM

## 2018-10-07 PROBLEM — T78.40XA ALLERGY, UNSPECIFIED, INITIAL ENCOUNTER: Chronic | Status: ACTIVE | Noted: 2018-09-06

## 2018-10-07 PROBLEM — F32.9 MAJOR DEPRESSIVE DISORDER, SINGLE EPISODE, UNSPECIFIED: Chronic | Status: ACTIVE | Noted: 2018-09-06

## 2018-10-07 LAB
ALBUMIN SERPL ELPH-MCNC: 3.2 G/DL — LOW (ref 3.5–5)
ALP SERPL-CCNC: 53 U/L — SIGNIFICANT CHANGE UP (ref 40–120)
ALT FLD-CCNC: 24 U/L DA — SIGNIFICANT CHANGE UP (ref 10–60)
ANION GAP SERPL CALC-SCNC: 7 MMOL/L — SIGNIFICANT CHANGE UP (ref 5–17)
AST SERPL-CCNC: 19 U/L — SIGNIFICANT CHANGE UP (ref 10–40)
BILIRUB SERPL-MCNC: 0.7 MG/DL — SIGNIFICANT CHANGE UP (ref 0.2–1.2)
BUN SERPL-MCNC: 22 MG/DL — HIGH (ref 7–18)
CALCIUM SERPL-MCNC: 8.7 MG/DL — SIGNIFICANT CHANGE UP (ref 8.4–10.5)
CHLORIDE SERPL-SCNC: 102 MMOL/L — SIGNIFICANT CHANGE UP (ref 96–108)
CO2 SERPL-SCNC: 27 MMOL/L — SIGNIFICANT CHANGE UP (ref 22–31)
CREAT SERPL-MCNC: 1.02 MG/DL — SIGNIFICANT CHANGE UP (ref 0.5–1.3)
GLUCOSE SERPL-MCNC: 99 MG/DL — SIGNIFICANT CHANGE UP (ref 70–99)
HCT VFR BLD CALC: 33.6 % — LOW (ref 34.5–45)
HGB BLD-MCNC: 11.1 G/DL — LOW (ref 11.5–15.5)
MCHC RBC-ENTMCNC: 29.3 PG — SIGNIFICANT CHANGE UP (ref 27–34)
MCHC RBC-ENTMCNC: 33.2 GM/DL — SIGNIFICANT CHANGE UP (ref 32–36)
MCV RBC AUTO: 88.2 FL — SIGNIFICANT CHANGE UP (ref 80–100)
PLATELET # BLD AUTO: 169 K/UL — SIGNIFICANT CHANGE UP (ref 150–400)
POTASSIUM SERPL-MCNC: 4.4 MMOL/L — SIGNIFICANT CHANGE UP (ref 3.5–5.3)
POTASSIUM SERPL-SCNC: 4.4 MMOL/L — SIGNIFICANT CHANGE UP (ref 3.5–5.3)
PROT SERPL-MCNC: 6.9 G/DL — SIGNIFICANT CHANGE UP (ref 6–8.3)
RBC # BLD: 3.8 M/UL — SIGNIFICANT CHANGE UP (ref 3.8–5.2)
RBC # FLD: 11.5 % — SIGNIFICANT CHANGE UP (ref 10.3–14.5)
SODIUM SERPL-SCNC: 136 MMOL/L — SIGNIFICANT CHANGE UP (ref 135–145)
TROPONIN I SERPL-MCNC: <0.015 NG/ML — SIGNIFICANT CHANGE UP (ref 0–0.04)
WBC # BLD: 8.4 K/UL — SIGNIFICANT CHANGE UP (ref 3.8–10.5)
WBC # FLD AUTO: 8.4 K/UL — SIGNIFICANT CHANGE UP (ref 3.8–10.5)

## 2018-10-07 PROCEDURE — 85027 COMPLETE CBC AUTOMATED: CPT

## 2018-10-07 PROCEDURE — 99284 EMERGENCY DEPT VISIT MOD MDM: CPT | Mod: 25

## 2018-10-07 PROCEDURE — 93005 ELECTROCARDIOGRAM TRACING: CPT

## 2018-10-07 PROCEDURE — 84484 ASSAY OF TROPONIN QUANT: CPT

## 2018-10-07 PROCEDURE — 99284 EMERGENCY DEPT VISIT MOD MDM: CPT

## 2018-10-07 PROCEDURE — 70450 CT HEAD/BRAIN W/O DYE: CPT | Mod: 26

## 2018-10-07 PROCEDURE — 71045 X-RAY EXAM CHEST 1 VIEW: CPT

## 2018-10-07 PROCEDURE — 96374 THER/PROPH/DIAG INJ IV PUSH: CPT

## 2018-10-07 PROCEDURE — 80053 COMPREHEN METABOLIC PANEL: CPT

## 2018-10-07 PROCEDURE — 71045 X-RAY EXAM CHEST 1 VIEW: CPT | Mod: 26

## 2018-10-07 PROCEDURE — 70450 CT HEAD/BRAIN W/O DYE: CPT

## 2018-10-07 RX ORDER — MECLIZINE HCL 12.5 MG
25 TABLET ORAL ONCE
Qty: 0 | Refills: 0 | Status: COMPLETED | OUTPATIENT
Start: 2018-10-07 | End: 2018-10-07

## 2018-10-07 RX ORDER — ONDANSETRON 8 MG/1
4 TABLET, FILM COATED ORAL ONCE
Qty: 0 | Refills: 0 | Status: COMPLETED | OUTPATIENT
Start: 2018-10-07 | End: 2018-10-07

## 2018-10-07 RX ORDER — MECLIZINE HCL 12.5 MG
1 TABLET ORAL
Qty: 30 | Refills: 0
Start: 2018-10-07

## 2018-10-07 RX ADMIN — Medication 25 MILLIGRAM(S): at 17:05

## 2018-10-07 RX ADMIN — ONDANSETRON 4 MILLIGRAM(S): 8 TABLET, FILM COATED ORAL at 17:04

## 2018-10-07 NOTE — ED ADULT NURSE NOTE - NSIMPLEMENTINTERV_GEN_ALL_ED
Implemented All Fall Risk Interventions:  [place] to call system. Call bell, personal items and telephone within reach. Instruct patient to call for assistance. Room bathroom lighting operational. Non-slip footwear when patient is off stretcher. Physically safe environment: no spills, clutter or unnecessary equipment. Stretcher in lowest position, wheels locked, appropriate side rails in place. Provide visual cue, wrist band, yellow gown, etc. Monitor gait and stability. Monitor for mental status changes and reorient to person, place, and time. Review medications for side effects contributing to fall risk. Reinforce activity limits and safety measures with patient and family.

## 2018-10-07 NOTE — ED ADULT NURSE NOTE - LANGUAGE ASSISTANCE NEEDED
primary nurse Citizen of [place] speaking/No-Patient/Caregiver offered and refused free interpretation services.

## 2018-10-07 NOTE — ED PROVIDER NOTE - PROGRESS NOTE DETAILS
Patient is resting comfortably, NAD. Patient remains asymptomatic. AAOx3, gait steady, speech clear. Neuro: CNII-XII intact. Gait steady. Speech clear. Reflexes 2+/4 in all extremities. Strength 5/5 in all extremities. Normal coordination. Vertigo likely due to concussion s/p head injury. Intermittent, short duration, sudden onset, associated with vomiting. To f/u with PMD tomorrow. Return to the ED immediately if getting worse, not improving, or if having any new or troubling symptoms.

## 2018-10-07 NOTE — ED PROVIDER NOTE - OBJECTIVE STATEMENT
77 y/o F pt with PMHx Asthma, depression, HTN and hypercholesteremia presents to ED c/o dizziness described as room spinning sensation associated with vomiting since yesterday. Pt fell yesterday morning and was sent to ED to have sutures placed and ulnar gutter splint placed. Pt was brought into ED by daughter who was concerned for onset of symptoms s/p fall. 79 y/o F pt with PMHx Asthma, depression, HTN and hypercholesteremia presents to ED c/o dizziness described as room spinning sensation associated with vomiting since yesterday. Pt fell yesterday morning and was sent to ED to have sutures placed and ulnar gutter splint placed. Had normal CT head yesterday. Vertigo occurs only with standing, resolved with lying down. No ha, cp, sob, ap, focal weakness, paresthesias. Asymptomatic at present.

## 2018-10-07 NOTE — ED PROVIDER NOTE - PHYSICAL EXAMINATION
Pt has sutured laceration to R eyebrow appearing clean, dry and intact. Has ecchymosis to R side of her face. Ulnar gutter splint was placed on R arm.

## 2018-11-08 ENCOUNTER — INPATIENT (INPATIENT)
Facility: HOSPITAL | Age: 78
LOS: 0 days | Discharge: HOME | DRG: 641 | End: 2018-11-09
Attending: INTERNAL MEDICINE | Admitting: INTERNAL MEDICINE
Payer: COMMERCIAL

## 2018-11-08 VITALS
HEART RATE: 75 BPM | SYSTOLIC BLOOD PRESSURE: 170 MMHG | HEIGHT: 59 IN | WEIGHT: 141.98 LBS | OXYGEN SATURATION: 100 % | TEMPERATURE: 98 F | RESPIRATION RATE: 16 BRPM | DIASTOLIC BLOOD PRESSURE: 78 MMHG

## 2018-11-08 DIAGNOSIS — E87.1 HYPO-OSMOLALITY AND HYPONATREMIA: ICD-10-CM

## 2018-11-08 DIAGNOSIS — F32.9 MAJOR DEPRESSIVE DISORDER, SINGLE EPISODE, UNSPECIFIED: ICD-10-CM

## 2018-11-08 DIAGNOSIS — Z98.890 OTHER SPECIFIED POSTPROCEDURAL STATES: Chronic | ICD-10-CM

## 2018-11-08 DIAGNOSIS — Z29.9 ENCOUNTER FOR PROPHYLACTIC MEASURES, UNSPECIFIED: ICD-10-CM

## 2018-11-08 DIAGNOSIS — E78.00 PURE HYPERCHOLESTEROLEMIA, UNSPECIFIED: ICD-10-CM

## 2018-11-08 DIAGNOSIS — I10 ESSENTIAL (PRIMARY) HYPERTENSION: ICD-10-CM

## 2018-11-08 DIAGNOSIS — J45.909 UNSPECIFIED ASTHMA, UNCOMPLICATED: ICD-10-CM

## 2018-11-08 LAB
ALBUMIN SERPL ELPH-MCNC: 3.6 G/DL — SIGNIFICANT CHANGE UP (ref 3.5–5)
ALP SERPL-CCNC: 75 U/L — SIGNIFICANT CHANGE UP (ref 40–120)
ALT FLD-CCNC: 22 U/L DA — SIGNIFICANT CHANGE UP (ref 10–60)
ANION GAP SERPL CALC-SCNC: 10 MMOL/L — SIGNIFICANT CHANGE UP (ref 5–17)
ANION GAP SERPL CALC-SCNC: 10 MMOL/L — SIGNIFICANT CHANGE UP (ref 5–17)
APPEARANCE UR: CLEAR — SIGNIFICANT CHANGE UP
AST SERPL-CCNC: 17 U/L — SIGNIFICANT CHANGE UP (ref 10–40)
BASOPHILS # BLD AUTO: 0.1 K/UL — SIGNIFICANT CHANGE UP (ref 0–0.2)
BASOPHILS NFR BLD AUTO: 1.9 % — SIGNIFICANT CHANGE UP (ref 0–2)
BILIRUB SERPL-MCNC: 0.6 MG/DL — SIGNIFICANT CHANGE UP (ref 0.2–1.2)
BILIRUB UR-MCNC: NEGATIVE — SIGNIFICANT CHANGE UP
BUN SERPL-MCNC: 20 MG/DL — HIGH (ref 7–18)
BUN SERPL-MCNC: 22 MG/DL — HIGH (ref 7–18)
CALCIUM SERPL-MCNC: 8 MG/DL — LOW (ref 8.4–10.5)
CALCIUM SERPL-MCNC: 8.4 MG/DL — SIGNIFICANT CHANGE UP (ref 8.4–10.5)
CHLORIDE SERPL-SCNC: 93 MMOL/L — LOW (ref 96–108)
CHLORIDE SERPL-SCNC: 97 MMOL/L — SIGNIFICANT CHANGE UP (ref 96–108)
CO2 SERPL-SCNC: 24 MMOL/L — SIGNIFICANT CHANGE UP (ref 22–31)
CO2 SERPL-SCNC: 25 MMOL/L — SIGNIFICANT CHANGE UP (ref 22–31)
COLOR SPEC: YELLOW — SIGNIFICANT CHANGE UP
CREAT SERPL-MCNC: 1.1 MG/DL — SIGNIFICANT CHANGE UP (ref 0.5–1.3)
CREAT SERPL-MCNC: 1.12 MG/DL — SIGNIFICANT CHANGE UP (ref 0.5–1.3)
DIFF PNL FLD: ABNORMAL
EOSINOPHIL # BLD AUTO: 0.1 K/UL — SIGNIFICANT CHANGE UP (ref 0–0.5)
EOSINOPHIL NFR BLD AUTO: 0.9 % — SIGNIFICANT CHANGE UP (ref 0–6)
GLUCOSE SERPL-MCNC: 119 MG/DL — HIGH (ref 70–99)
GLUCOSE SERPL-MCNC: 89 MG/DL — SIGNIFICANT CHANGE UP (ref 70–99)
GLUCOSE UR QL: NEGATIVE — SIGNIFICANT CHANGE UP
HCT VFR BLD CALC: 34.4 % — LOW (ref 34.5–45)
HGB BLD-MCNC: 11.6 G/DL — SIGNIFICANT CHANGE UP (ref 11.5–15.5)
KETONES UR-MCNC: NEGATIVE — SIGNIFICANT CHANGE UP
LEUKOCYTE ESTERASE UR-ACNC: ABNORMAL
LYMPHOCYTES # BLD AUTO: 2.1 K/UL — SIGNIFICANT CHANGE UP (ref 1–3.3)
LYMPHOCYTES # BLD AUTO: 31.2 % — SIGNIFICANT CHANGE UP (ref 13–44)
MAGNESIUM SERPL-MCNC: 1.9 MG/DL — SIGNIFICANT CHANGE UP (ref 1.6–2.6)
MCHC RBC-ENTMCNC: 28.6 PG — SIGNIFICANT CHANGE UP (ref 27–34)
MCHC RBC-ENTMCNC: 33.6 GM/DL — SIGNIFICANT CHANGE UP (ref 32–36)
MCV RBC AUTO: 85.1 FL — SIGNIFICANT CHANGE UP (ref 80–100)
MONOCYTES # BLD AUTO: 1 K/UL — HIGH (ref 0–0.9)
MONOCYTES NFR BLD AUTO: 15.4 % — HIGH (ref 2–14)
NEUTROPHILS # BLD AUTO: 3.4 K/UL — SIGNIFICANT CHANGE UP (ref 1.8–7.4)
NEUTROPHILS NFR BLD AUTO: 50.6 % — SIGNIFICANT CHANGE UP (ref 43–77)
NITRITE UR-MCNC: NEGATIVE — SIGNIFICANT CHANGE UP
OSMOLALITY SERPL: 268 MOS/KG — LOW (ref 275–300)
PH UR: 6.5 — SIGNIFICANT CHANGE UP (ref 5–8)
PLATELET # BLD AUTO: 197 K/UL — SIGNIFICANT CHANGE UP (ref 150–400)
POTASSIUM SERPL-MCNC: 4.4 MMOL/L — SIGNIFICANT CHANGE UP (ref 3.5–5.3)
POTASSIUM SERPL-MCNC: 4.4 MMOL/L — SIGNIFICANT CHANGE UP (ref 3.5–5.3)
POTASSIUM SERPL-SCNC: 4.4 MMOL/L — SIGNIFICANT CHANGE UP (ref 3.5–5.3)
POTASSIUM SERPL-SCNC: 4.4 MMOL/L — SIGNIFICANT CHANGE UP (ref 3.5–5.3)
PROT SERPL-MCNC: 7.6 G/DL — SIGNIFICANT CHANGE UP (ref 6–8.3)
PROT UR-MCNC: NEGATIVE — SIGNIFICANT CHANGE UP
RBC # BLD: 4.05 M/UL — SIGNIFICANT CHANGE UP (ref 3.8–5.2)
RBC # FLD: 11.3 % — SIGNIFICANT CHANGE UP (ref 10.3–14.5)
SODIUM SERPL-SCNC: 128 MMOL/L — LOW (ref 135–145)
SODIUM SERPL-SCNC: 131 MMOL/L — LOW (ref 135–145)
SP GR SPEC: 1.01 — SIGNIFICANT CHANGE UP (ref 1.01–1.02)
UROBILINOGEN FLD QL: NEGATIVE — SIGNIFICANT CHANGE UP
WBC # BLD: 6.7 K/UL — SIGNIFICANT CHANGE UP (ref 3.8–10.5)
WBC # FLD AUTO: 6.7 K/UL — SIGNIFICANT CHANGE UP (ref 3.8–10.5)

## 2018-11-08 PROCEDURE — 93010 ELECTROCARDIOGRAM REPORT: CPT

## 2018-11-08 PROCEDURE — 99285 EMERGENCY DEPT VISIT HI MDM: CPT

## 2018-11-08 RX ORDER — PANTOPRAZOLE SODIUM 20 MG/1
40 TABLET, DELAYED RELEASE ORAL
Qty: 0 | Refills: 0 | Status: DISCONTINUED | OUTPATIENT
Start: 2018-11-08 | End: 2018-11-09

## 2018-11-08 RX ORDER — SODIUM CHLORIDE 9 MG/ML
1000 INJECTION INTRAMUSCULAR; INTRAVENOUS; SUBCUTANEOUS
Qty: 0 | Refills: 0 | Status: DISCONTINUED | OUTPATIENT
Start: 2018-11-08 | End: 2018-11-09

## 2018-11-08 RX ORDER — MONTELUKAST 4 MG/1
10 TABLET, CHEWABLE ORAL AT BEDTIME
Qty: 0 | Refills: 0 | Status: DISCONTINUED | OUTPATIENT
Start: 2018-11-08 | End: 2018-11-09

## 2018-11-08 RX ORDER — DULOXETINE HYDROCHLORIDE 30 MG/1
60 CAPSULE, DELAYED RELEASE ORAL DAILY
Qty: 0 | Refills: 0 | Status: DISCONTINUED | OUTPATIENT
Start: 2018-11-08 | End: 2018-11-09

## 2018-11-08 RX ORDER — ATORVASTATIN CALCIUM 80 MG/1
40 TABLET, FILM COATED ORAL AT BEDTIME
Qty: 0 | Refills: 0 | Status: DISCONTINUED | OUTPATIENT
Start: 2018-11-08 | End: 2018-11-09

## 2018-11-08 RX ORDER — ENOXAPARIN SODIUM 100 MG/ML
40 INJECTION SUBCUTANEOUS DAILY
Qty: 0 | Refills: 0 | Status: DISCONTINUED | OUTPATIENT
Start: 2018-11-08 | End: 2018-11-09

## 2018-11-08 RX ORDER — LOSARTAN POTASSIUM 100 MG/1
50 TABLET, FILM COATED ORAL DAILY
Qty: 0 | Refills: 0 | Status: DISCONTINUED | OUTPATIENT
Start: 2018-11-08 | End: 2018-11-09

## 2018-11-08 RX ORDER — SODIUM CHLORIDE 9 MG/ML
1000 INJECTION INTRAMUSCULAR; INTRAVENOUS; SUBCUTANEOUS ONCE
Qty: 0 | Refills: 0 | Status: COMPLETED | OUTPATIENT
Start: 2018-11-08 | End: 2018-11-08

## 2018-11-08 RX ADMIN — SODIUM CHLORIDE 75 MILLILITER(S): 9 INJECTION INTRAMUSCULAR; INTRAVENOUS; SUBCUTANEOUS at 21:52

## 2018-11-08 RX ADMIN — DULOXETINE HYDROCHLORIDE 60 MILLIGRAM(S): 30 CAPSULE, DELAYED RELEASE ORAL at 21:52

## 2018-11-08 RX ADMIN — ATORVASTATIN CALCIUM 40 MILLIGRAM(S): 80 TABLET, FILM COATED ORAL at 21:52

## 2018-11-08 RX ADMIN — SODIUM CHLORIDE 2000 MILLILITER(S): 9 INJECTION INTRAMUSCULAR; INTRAVENOUS; SUBCUTANEOUS at 17:32

## 2018-11-08 RX ADMIN — MONTELUKAST 10 MILLIGRAM(S): 4 TABLET, CHEWABLE ORAL at 21:52

## 2018-11-08 NOTE — H&P ADULT - ASSESSMENT
79 y/o female from home [place] speaking daughter at bedside translating with PMH of HTn (HCTZ+Losartan) , HLD,  Depression, asthma was sent by PCP due to low sodium of 128. Patient daughter states that she went to her PCP where she was found to have a low sodium 128. Patient is AXO 3, complains of lethargy, drowsiness, anorexia, malaise, intermittent chest pain since 1 week. She denies fever, chills, diarrhea, vomiting, cough, dysuria or any other complain. As per daughter, patient also had an unwitnessed fall 2 months ago, hit her head and had a laceration of forehead. She is compliant to her medications including HCTZ. As per patient's daughter, Patient's BP is running high in 170/80 as her PCP has decreased her losartan from 100 mg to 50 mg    Patient was admitted in past for similar symptoms, had Sodium of 126 in the past, was raised to 136-138.     In ED, patient's /80, EKG shows NSR, Labs were remarkable for sodium of 128. Patient was given IVF, before sending UA

## 2018-11-08 NOTE — ED ADULT NURSE NOTE - NSIMPLEMENTINTERV_GEN_ALL_ED
Implemented All Fall Risk Interventions:  [place] to call system. Call bell, personal items and telephone within reach. Instruct patient to call for assistance. Room bathroom lighting operational. Non-slip footwear when patient is off stretcher. Physically safe environment: no spills, clutter or unnecessary equipment. Stretcher in lowest position, wheels locked, appropriate side rails in place. Provide visual cue, wrist band, yellow gown, etc. Monitor gait and stability. Monitor for mental status changes and reorient to person, place, and time. Review medications for side effects contributing to fall risk. Reinforce activity limits and safety measures with patient and family.

## 2018-11-08 NOTE — PATIENT PROFILE ADULT - STATED REASON FOR ADMISSION
I went to cardiologist and found Patient's safety maintained. Denies pain/discomfort. Not in any form of cardiac nor respiratory distress. mu sodium is low and the doctor told me to go to the hospital right away.

## 2018-11-08 NOTE — ED ADULT NURSE REASSESSMENT NOTE - NS ED NURSE REASSESS COMMENT FT1
received pt  awake alert oriented x 3 not in dsitress,with saline lock intact no redness no swelling noted.waiting for bed.

## 2018-11-08 NOTE — H&P ADULT - NSHPPHYSICALEXAM_GEN_ALL_CORE
ICU Vital Signs Last 24 Hrs  T(C): 36.7 (08 Nov 2018 19:51), Max: 36.7 (08 Nov 2018 19:51)  T(F): 98.1 (08 Nov 2018 19:51), Max: 98.1 (08 Nov 2018 19:51)  HR: 81 (08 Nov 2018 19:51) (75 - 81)  BP: 155/79 (08 Nov 2018 19:51) (155/79 - 170/78)  BP(mean): --  ABP: --  ABP(mean): --  RR: 18 (08 Nov 2018 19:51) (16 - 18)  SpO2: 99% (08 Nov 2018 19:51) (99% - 100%)    PHYSICAL EXAM:  GENERAL: NAD,   HEAD:  Atraumatic, Normocephalic  EYES:  conjunctiva and sclera clear  NECK: Supple, No JVD, Normal thyroid  CHEST/LUNG: Clear to auscultation. Clear to percussion bilaterally; No rales, rhonchi, wheezing, or rubs  HEART: Regular rate and rhythm; No murmurs, rubs, or gallops  ABDOMEN: Soft, Nontender, Nondistended; Bowel sounds present  NERVOUS SYSTEM:  Alert & Oriented X3,    EXTREMITIES:  2+ Peripheral Pulses, No clubbing, cyanosis, or edema  SKIN: warm dry

## 2018-11-08 NOTE — ED PROVIDER NOTE - MEDICAL DECISION MAKING DETAILS
Hyponatremia, decreased PO and dry skin with mildly hypovolemic hypoNa. No current symptoms, ambulating and talking without effort, well appearing, hemodynamically stable. D/w Dr. Redding who requested admission. Given 1L NS. Admitted to internal medicine for further monitoring, w/u, and care.

## 2018-11-08 NOTE — H&P ADULT - PROBLEM SELECTOR PLAN 5
As per previous record patient was taking cymbalta  Medication list given did not have cymbalta  please confirm with daughter c/w cymbalta

## 2018-11-08 NOTE — ED PROVIDER NOTE - CARE PLAN
Principal Discharge DX:	Hyponatremia Principal Discharge DX:	Hyponatremia  Secondary Diagnosis:	Dehydration

## 2018-11-08 NOTE — ED PROVIDER NOTE - PHYSICAL EXAMINATION
Afebrile, hemodynamically stable, saturating well  NAD, well appearing  Head NCAT  EOMI grossly, anicteric  MMM  RRR, nml S1/S2, no m/r/g  Lungs CTAB, no w/r/r  Abd soft, NT, ND, nml BS, no rebound or guarding  AAO, CN's 3-12 grossly intact  MULLINS spontaneously, no leg cyanosis or edema  Skin warm, dry, no rashes or hives

## 2018-11-08 NOTE — ED ADULT NURSE NOTE - OBJECTIVE STATEMENT
alert and verbally responsive referred [facility] for Hyponatremia also c/o uncontrolled blood pressure  dizziness poor appetite Pt denies C/P SOB

## 2018-11-08 NOTE — ED PROVIDER NOTE - OBJECTIVE STATEMENT
Declines , uses daughter. 78yoF with h/o HTN, HLD, asthma, presents with hyponatremia. For the past few days has had decreased appetite, lightheadedness, not currently present. Saw her doctor who ordered bloodwork, found Na 128/Cl 86, and sent to ED. No vomiting, diarrhea, HA, fever, dysuria, or any other symptoms.  Meds: Losartan 50 (switched from 100), Singulair, Crestor

## 2018-11-08 NOTE — H&P ADULT - PROBLEM SELECTOR PLAN 1
p/w sodium of 128, with symptoms of lethargy, anorexia, malaise  likely due to HCTZ with poor oral intake. p/w sodium of 128, with symptoms of lethargy, anorexia, malaise  likely due to HCTZ with poor oral intake.  Currently AXO3  s/p IVF  sodium increase to 130  continue IVF  for 12 hour  f/u urine electroytes( although result will be affected as patient got fluid in ED)  HOLD HCTZ  Monitor BMP  Nephro: Dr. Walker p/w sodium of 128, with symptoms of lethargy, anorexia, malaise  likely due to HCTZ with poor oral intake.  Currently AXO3  s/p IVF  sodium increase to 130  continue IVF  for 12 hour  f/u urine electroytes( although result will be affected as patient got fluid in ED)  HOLD HCTZ  Monitor BMP  started  DASh diet as sodium will correct fast if patient takes salt with IVF  Nephro: Dr. Walker

## 2018-11-08 NOTE — H&P ADULT - HISTORY OF PRESENT ILLNESS
79 y/o female from home [place] speaking daughter at bedside translating with PMH of HTn (HCTZ+Losartan) , HLD,  Depression, asthma was sent by PCP due to low sodium of 128. Patient daughter states that she went to her PCP where she was found to have a low sodium. 79 y/o female from home [place] speaking daughter at bedside translating with PMH of HTn (HCTZ+Losartan) , HLD,  Depression, asthma was sent by PCP due to low sodium of 128. Patient daughter states that she went to her PCP where she was found to have a low sodium 128. Patient is AXO 3, complains of lethargy, drowsiness, anorexia, malaise, intermittent chest pain since 1 week. She denies fever, chills, diarrhea, vomiting, cough, dysuria or any other complain. As per daughter, patient also had an unwitnessed fall 2 months ago, hit her head and had a laceration of forehead. She is compliant to her medications including HCTZ. As per patient's daughter, Patient's BP is running high in 170/80 as her PCP has decreased her losartan from 100 mg to 50 mg    Patient was admitted in past for similar symptoms, had Sodium of 126 in the past, was raised to 136-138.     In ED, patient's /80, EKG shows NSR, Labs were remarkable for sodium of 128. Patient was given IVF, before sending UA

## 2018-11-09 ENCOUNTER — TRANSCRIPTION ENCOUNTER (OUTPATIENT)
Age: 78
End: 2018-11-09

## 2018-11-09 VITALS
OXYGEN SATURATION: 100 % | RESPIRATION RATE: 16 BRPM | TEMPERATURE: 98 F | DIASTOLIC BLOOD PRESSURE: 58 MMHG | HEART RATE: 64 BPM | SYSTOLIC BLOOD PRESSURE: 112 MMHG

## 2018-11-09 DIAGNOSIS — I10 ESSENTIAL (PRIMARY) HYPERTENSION: ICD-10-CM

## 2018-11-09 DIAGNOSIS — E86.0 DEHYDRATION: ICD-10-CM

## 2018-11-09 LAB
ANION GAP SERPL CALC-SCNC: 5 MMOL/L — SIGNIFICANT CHANGE UP (ref 5–17)
ANION GAP SERPL CALC-SCNC: 7 MMOL/L — SIGNIFICANT CHANGE UP (ref 5–17)
BASOPHILS # BLD AUTO: 0.1 K/UL — SIGNIFICANT CHANGE UP (ref 0–0.2)
BASOPHILS NFR BLD AUTO: 1.2 % — SIGNIFICANT CHANGE UP (ref 0–2)
BUN SERPL-MCNC: 15 MG/DL — SIGNIFICANT CHANGE UP (ref 7–18)
BUN SERPL-MCNC: 17 MG/DL — SIGNIFICANT CHANGE UP (ref 7–18)
CALCIUM SERPL-MCNC: 7.8 MG/DL — LOW (ref 8.4–10.5)
CALCIUM SERPL-MCNC: 8.3 MG/DL — LOW (ref 8.4–10.5)
CHLORIDE SERPL-SCNC: 102 MMOL/L — SIGNIFICANT CHANGE UP (ref 96–108)
CHLORIDE SERPL-SCNC: 102 MMOL/L — SIGNIFICANT CHANGE UP (ref 96–108)
CHOLEST SERPL-MCNC: 147 MG/DL — SIGNIFICANT CHANGE UP (ref 10–199)
CO2 SERPL-SCNC: 23 MMOL/L — SIGNIFICANT CHANGE UP (ref 22–31)
CO2 SERPL-SCNC: 26 MMOL/L — SIGNIFICANT CHANGE UP (ref 22–31)
CREAT SERPL-MCNC: 0.87 MG/DL — SIGNIFICANT CHANGE UP (ref 0.5–1.3)
CREAT SERPL-MCNC: 0.89 MG/DL — SIGNIFICANT CHANGE UP (ref 0.5–1.3)
EOSINOPHIL # BLD AUTO: 0 K/UL — SIGNIFICANT CHANGE UP (ref 0–0.5)
EOSINOPHIL NFR BLD AUTO: 0.7 % — SIGNIFICANT CHANGE UP (ref 0–6)
FOLATE SERPL-MCNC: 17.4 NG/ML — SIGNIFICANT CHANGE UP
GLUCOSE SERPL-MCNC: 126 MG/DL — HIGH (ref 70–99)
GLUCOSE SERPL-MCNC: 89 MG/DL — SIGNIFICANT CHANGE UP (ref 70–99)
HCT VFR BLD CALC: 33.2 % — LOW (ref 34.5–45)
HDLC SERPL-MCNC: 70 MG/DL — SIGNIFICANT CHANGE UP
HGB BLD-MCNC: 10.8 G/DL — LOW (ref 11.5–15.5)
LIPID PNL WITH DIRECT LDL SERPL: 69 MG/DL — SIGNIFICANT CHANGE UP
LYMPHOCYTES # BLD AUTO: 1.5 K/UL — SIGNIFICANT CHANGE UP (ref 1–3.3)
LYMPHOCYTES # BLD AUTO: 30 % — SIGNIFICANT CHANGE UP (ref 13–44)
MAGNESIUM SERPL-MCNC: 2.1 MG/DL — SIGNIFICANT CHANGE UP (ref 1.6–2.6)
MCHC RBC-ENTMCNC: 28.1 PG — SIGNIFICANT CHANGE UP (ref 27–34)
MCHC RBC-ENTMCNC: 32.6 GM/DL — SIGNIFICANT CHANGE UP (ref 32–36)
MCV RBC AUTO: 86.4 FL — SIGNIFICANT CHANGE UP (ref 80–100)
MONOCYTES # BLD AUTO: 0.8 K/UL — SIGNIFICANT CHANGE UP (ref 0–0.9)
MONOCYTES NFR BLD AUTO: 15.8 % — HIGH (ref 2–14)
NEUTROPHILS # BLD AUTO: 2.7 K/UL — SIGNIFICANT CHANGE UP (ref 1.8–7.4)
NEUTROPHILS NFR BLD AUTO: 52.3 % — SIGNIFICANT CHANGE UP (ref 43–77)
PHOSPHATE SERPL-MCNC: 3 MG/DL — SIGNIFICANT CHANGE UP (ref 2.5–4.5)
PLATELET # BLD AUTO: 175 K/UL — SIGNIFICANT CHANGE UP (ref 150–400)
POTASSIUM SERPL-MCNC: 4.1 MMOL/L — SIGNIFICANT CHANGE UP (ref 3.5–5.3)
POTASSIUM SERPL-MCNC: 4.7 MMOL/L — SIGNIFICANT CHANGE UP (ref 3.5–5.3)
POTASSIUM SERPL-SCNC: 4.1 MMOL/L — SIGNIFICANT CHANGE UP (ref 3.5–5.3)
POTASSIUM SERPL-SCNC: 4.7 MMOL/L — SIGNIFICANT CHANGE UP (ref 3.5–5.3)
RBC # BLD: 3.85 M/UL — SIGNIFICANT CHANGE UP (ref 3.8–5.2)
RBC # FLD: 11.5 % — SIGNIFICANT CHANGE UP (ref 10.3–14.5)
SODIUM SERPL-SCNC: 132 MMOL/L — LOW (ref 135–145)
SODIUM SERPL-SCNC: 133 MMOL/L — LOW (ref 135–145)
TOTAL CHOLESTEROL/HDL RATIO MEASUREMENT: 2.1 RATIO — LOW (ref 3.3–7.1)
TRIGL SERPL-MCNC: 42 MG/DL — SIGNIFICANT CHANGE UP (ref 10–149)
TSH SERPL-MCNC: 1.44 UU/ML — SIGNIFICANT CHANGE UP (ref 0.34–4.82)
VIT B12 SERPL-MCNC: 628 PG/ML — SIGNIFICANT CHANGE UP (ref 232–1245)
WBC # BLD: 5.1 K/UL — SIGNIFICANT CHANGE UP (ref 3.8–10.5)
WBC # FLD AUTO: 5.1 K/UL — SIGNIFICANT CHANGE UP (ref 3.8–10.5)

## 2018-11-09 PROCEDURE — 83930 ASSAY OF BLOOD OSMOLALITY: CPT

## 2018-11-09 PROCEDURE — 84100 ASSAY OF PHOSPHORUS: CPT

## 2018-11-09 PROCEDURE — 82607 VITAMIN B-12: CPT

## 2018-11-09 PROCEDURE — 80048 BASIC METABOLIC PNL TOTAL CA: CPT

## 2018-11-09 PROCEDURE — 85027 COMPLETE CBC AUTOMATED: CPT

## 2018-11-09 PROCEDURE — 99285 EMERGENCY DEPT VISIT HI MDM: CPT | Mod: 25

## 2018-11-09 PROCEDURE — 84443 ASSAY THYROID STIM HORMONE: CPT

## 2018-11-09 PROCEDURE — 93005 ELECTROCARDIOGRAM TRACING: CPT

## 2018-11-09 PROCEDURE — 83735 ASSAY OF MAGNESIUM: CPT

## 2018-11-09 PROCEDURE — 82746 ASSAY OF FOLIC ACID SERUM: CPT

## 2018-11-09 PROCEDURE — 80061 LIPID PANEL: CPT

## 2018-11-09 PROCEDURE — 80053 COMPREHEN METABOLIC PANEL: CPT

## 2018-11-09 PROCEDURE — 81001 URINALYSIS AUTO W/SCOPE: CPT

## 2018-11-09 RX ORDER — LOSARTAN POTASSIUM 100 MG/1
1 TABLET, FILM COATED ORAL
Qty: 0 | Refills: 0 | COMMUNITY
Start: 2018-11-09

## 2018-11-09 RX ORDER — LOSARTAN/HYDROCHLOROTHIAZIDE 100MG-25MG
1 TABLET ORAL
Qty: 0 | Refills: 0 | COMMUNITY

## 2018-11-09 RX ORDER — INFLUENZA VIRUS VACCINE 15; 15; 15; 15 UG/.5ML; UG/.5ML; UG/.5ML; UG/.5ML
0.5 SUSPENSION INTRAMUSCULAR ONCE
Qty: 0 | Refills: 0 | Status: DISCONTINUED | OUTPATIENT
Start: 2018-11-09 | End: 2018-11-09

## 2018-11-09 RX ORDER — LOSARTAN POTASSIUM 100 MG/1
1 TABLET, FILM COATED ORAL
Qty: 30 | Refills: 0
Start: 2018-11-09 | End: 2018-12-08

## 2018-11-09 RX ADMIN — DULOXETINE HYDROCHLORIDE 60 MILLIGRAM(S): 30 CAPSULE, DELAYED RELEASE ORAL at 13:11

## 2018-11-09 RX ADMIN — ENOXAPARIN SODIUM 40 MILLIGRAM(S): 100 INJECTION SUBCUTANEOUS at 13:11

## 2018-11-09 RX ADMIN — PANTOPRAZOLE SODIUM 40 MILLIGRAM(S): 20 TABLET, DELAYED RELEASE ORAL at 06:14

## 2018-11-09 RX ADMIN — LOSARTAN POTASSIUM 50 MILLIGRAM(S): 100 TABLET, FILM COATED ORAL at 06:14

## 2018-11-09 NOTE — DISCHARGE NOTE ADULT - CARE PLAN
Principal Discharge DX:	Hyponatremia  Goal:	patient will remain symptom free  Assessment and plan of treatment:	You were admitted to the hospital due to your sodium being low. Your HCTZ was stopped at this can cause your sodium to be low. You were given IV fluids with improvement of your sodium to normal levels. Please continue to maintain adequate hydration and follow up with your PCP as an outpatient.  Secondary Diagnosis:	Dehydration  Assessment and plan of treatment:	continue to maintain adequate hydration  Secondary Diagnosis:	Asthma  Assessment and plan of treatment:	continue on your home regimen and follow up with your outpatient pulmonologist  Secondary Diagnosis:	Depression  Assessment and plan of treatment:	continue on your home duloxetine and follow up with psych as an outpatient  Secondary Diagnosis:	HTN (hypertension)  Assessment and plan of treatment:	your blood pressure was stable while you were in the hospital. Please continue on losartan 50 and follow up with your outpatient cardiologist for possible medication adjustment.  Secondary Diagnosis:	Hypercholesterolemia  Assessment and plan of treatment:	continue on Crestor and TLC diet. Follow up with your PCP as an outpatient

## 2018-11-09 NOTE — DISCHARGE NOTE ADULT - PLAN OF CARE
patient will remain symptom free You were admitted to the hospital due to your sodium being low. Your HCTZ was stopped at this can cause your sodium to be low. You were given IV fluids with improvement of your sodium to normal levels. Please continue to maintain adequate hydration and follow up with your PCP as an outpatient. continue to maintain adequate hydration continue on your home regimen and follow up with your outpatient pulmonologist continue on your home duloxetine and follow up with psych as an outpatient your blood pressure was stable while you were in the hospital. Please continue on losartan 50 and follow up with your outpatient cardiologist for possible medication adjustment. continue on Crestor and TLC diet. Follow up with your PCP as an outpatient

## 2018-11-09 NOTE — DISCHARGE NOTE ADULT - CARE PROVIDER_API CALL
Joe eRdding), Internal Medicine  [address] [facility] [address]  [place] [address]  Phone: [phone]  Fax: [phone]    Eddie Walker (MD), Internal Medicine; Nephrology  [address] [place] [address]  Phone: [phone]  Fax: [phone]    Bowen Malik), Medicine  [address] [place] [address]  Phone: [phone]  Fax: [phone]

## 2018-11-09 NOTE — DISCHARGE NOTE ADULT - MEDICATION SUMMARY - MEDICATIONS TO TAKE
I will START or STAY ON the medications listed below when I get home from the hospital:    losartan 50 mg oral tablet  -- 1 tab(s) by mouth once a day  -- Indication: For HTN (hypertension)    DULoxetine 60 mg oral delayed release capsule  -- 1 cap(s) by mouth once a day  -- Indication: For Depression    meclizine 25 mg oral tablet  -- 1 tab(s) by mouth every 6 hours, As Needed- for dizziness   -- May cause drowsiness.  Alcohol may intensify this effect.  Use care when operating dangerous machinery.      -- Indication: For Prophylactic measure    ZyrTEC 10 mg oral tablet  -- 1 tab(s) by mouth once a day  -- Indication: For Prophylactic measure    Crestor 10 mg oral tablet  -- 1 tab(s) by mouth once a day (at bedtime)  -- Indication: For Hypercholesterolemia    Ventolin HFA 90 mcg/inh inhalation aerosol  -- 2 puff(s) inhaled 4 times a day, As Needed  -- Indication: For Prophylactic measure    Senna 8.6 mg oral tablet  -- 1 tab(s) by mouth 2 times a day  -- Indication: For Prophylactic measure    Singulair 10 mg oral tablet  -- 1 tab(s) by mouth once a day (in the evening)  -- Indication: For Prophylactic measure    PriLOSEC 40 mg oral delayed release capsule  -- 1 cap(s) by mouth once a day  -- Indication: For Prophylactic measure    Calcium 600+D 600 mg-200 intl units oral tablet  -- 1 tab(s) by mouth 2 times a day  -- Indication: For Prophylactic measure    ascorbic acid 500 mg oral tablet  -- 1 tab(s) by mouth once a day  -- Indication: For Prophylactic measure

## 2018-11-09 NOTE — CONSULT NOTE ADULT - SUBJECTIVE AND OBJECTIVE BOX
CHIEF COMPLAINT: Patient is a 78y old  Female who presents with a chief complaint of Sent from PCP clinic due to low sodium (2018 12:16)    HPI:  79 y/o female from home Kyrgy[place] speaking daughter at bedside translating with PMH of HTn (HCTZ+Losartan) , HLD,  Depression, asthma was sent by PCP due to low sodium of 128. Patient daughter states that she went to her PCP where she was found to have a low sodium 128. Patient is AXO 3, complains of lethargy, drowsiness, anorexia, malaise, intermittent chest pain since 1 week. She denies fever, chills, diarrhea, vomiting, cough, dysuria or any other complain. As per daughter, patient also had an unwitnessed fall 2 months ago, hit her head and had a laceration of forehead. She is compliant to her medications including HCTZ. As per patient's daughter, Patient's BP is running high in 170/80 as her PCP has decreased her losartan from 100 mg to 50 mg    Patient was admitted in past for similar symptoms, had Sodium of 126 in the past, was raised to 136-138.     In ED, patient's /80, EKG shows NSR, Labs were remarkable for sodium of 128. Patient was given IVF, before sending UA (2018 19:55)   Patient seen and examined.     PAST MEDICAL & SURGICAL HISTORY:  Allergy  Depression  HTN (hypertension)  Hypercholesterolemia  Asthma  S/P hernia surgery      Allergies    No Known Allergies    Intolerances        MEDICATIONS  (STANDING):  atorvastatin 40 milliGRAM(s) Oral at bedtime  DULoxetine 60 milliGRAM(s) Oral daily  enoxaparin Injectable 40 milliGRAM(s) SubCutaneous daily  influenza   Vaccine 0.5 milliLiter(s) IntraMuscular once  losartan 50 milliGRAM(s) Oral daily  montelukast 10 milliGRAM(s) Oral at bedtime  pantoprazole    Tablet 40 milliGRAM(s) Oral before breakfast  sodium chloride 0.9%. 1000 milliLiter(s) (75 mL/Hr) IV Continuous <Continuous>      MEDICATIONS  (PRN):   Medications up to date at time of exam.    FAMILY HISTORY:  No pertinent family history in first degree relatives      SOCIAL HISTORY  Smoking History: [   ] smoking/smoke exposure, [   ] former smoker, [x  ] denies smoking  Living Condition: [   ] apartment, [   ] private house  Work History:   Travel History: denies recent travel  Illicit Substance Use: denies  Alcohol Use: denies    REVIEW OF SYSTEMS:    CONSTITUTIONAL:  denies fevers, chills, sweats, weight loss    HEENT:  denies diplopia or blurred vision, sore throat or runny nose.    CARDIOVASCULAR:  denies pressure, squeezing, tightness, or heaviness about the chest; no palpitations.    RESPIRATORY:  denies SOB, cough, BONILLA, wheezing.    GASTROINTESTINAL:  denies abdominal pain, nausea, vomiting or diarrhea.    GENITOURINARY: denies dysuria, frequency or urgency.    NEUROLOGIC:  denies numbness, tingling, seizures or weakness.    PSYCHIATRIC:  denies disorder of thought or mood.    MSK: denies swelling, redness      PHYSICAL EXAMINATION:    GENERAL: The patient is a well-developed, well-nourished, in no apparent distress.     Vital Signs Last 24 Hrs  T(C): 36.6 (2018 04:35), Max: 36.7 (2018 19:51)  T(F): 97.9 (2018 04:35), Max: 98.1 (2018 19:51)  HR: 66 (2018 04:35) (57 - 81)  BP: 133/74 (2018 04:35) (133/74 - 170/78)  BP(mean): --  RR: 18 (2018 04:35) (16 - 20)  SpO2: 99% (2018 04:35) (99% - 100%)   (if applicable)    Chest Tube (if applicable)    HEENT: Head is normocephalic and atraumatic. .    NECK: Supple, no palpable adenopathy.    LUNGS: Clear to auscultation, no wheezing, rales, or rhonchi.    HEART: Regular rate and rhythm without murmur.    ABDOMEN: Soft, nontender, and nondistended.  No hepatosplenomegaly is noted.    EXTREMITIES: Without any cyanosis, clubbing, rash, lesions or edema.    NEUROLOGIC: Awake, alert.    SKIN: Warm, dry, good turgor.      LABS:                        10.8   5.1   )-----------( 175      ( 2018 07:37 )             33.2         133<L>  |  102  |  15  ----------------------------<  89  4.7   |  26  |  0.89    Ca    8.3<L>      2018 07:37  Phos  3.0     -  Mg     2.1         TPro  7.6  /  Alb  3.6  /  TBili  0.6  /  DBili  x   /  AST  17  /  ALT  22  /  AlkPhos  75  -      Urinalysis Basic - ( 2018 18:49 )    Color: Yellow / Appearance: Clear / S.010 / pH: x  Gluc: x / Ketone: Negative  / Bili: Negative / Urobili: Negative   Blood: x / Protein: Negative / Nitrite: Negative   Leuk Esterase: Small / RBC: 2-5 /HPF / WBC 6-10 /HPF   Sq Epi: x / Non Sq Epi: Few /HPF / Bacteria: Trace /HPF          MICROBIOLOGY: (if applicable)    RADIOLOGY & ADDITIONAL STUDIES:  EKG:   CXR:    ECHO:    IMPRESSION: 78y Female PAST MEDICAL & SURGICAL HISTORY:  Allergy  Depression  HTN (hypertension)  Hypercholesterolemia  Asthma  S/P hernia surgery       79 y/o female from home Kyrgyz speaking daughter at bedside translating with PMH of HTn (HCTZ+Losartan) , HLD,  Depression, asthma was sent by PCP due to low sodium of 128. Patient daughter states that she went to her PCP where she was found to have a low sodium 128. Patient is AXO 3, complains of lethargy, drowsiness, anorexia, malaise, intermittent chest pain since 1 week. She denies fever, chills, diarrhea, vomiting, cough, dysuria or any other complain. As per daughter, patient also had an unwitnessed fall 2 months ago, hit her head and had a laceration of forehead. She is compliant to her medications including HCTZ. As per patient's daughter, Patient's BP is running high in 170/80 as her PCP has decreased her losartan from 100 mg to 50 mg    Patient was admitted in past for similar symptoms, had Sodium of 126 in the past, was raised to 136-138.     --    79 y/o female presents with low sodium from PMD's office. She is c/o occasional SOB, likely from asthma.    +Na improved, 133  +asthma  +mild elevation and slight blunting of L hemidiaphragm    Suggestion:   - bronchodilators, o2 supp prn    - DVT and GI prophylaxis.    - monitor electrolytes   - d/c planning as per primary team CHIEF COMPLAINT: Patient is a 78y old  Female who presents with a chief complaint of Sent from PCP clinic due to low sodium (2018 12:16)    HPI:  77 y/o female from home [place]serratian speaking daughter at bedside translating with PMH of HTn (HCTZ+Losartan) , HLD,  Depression, asthma was sent by PCP due to low sodium of 128. Patient daughter states that she went to her PCP where she was found to have a low sodium 128. Patient is AXO 3, complains of lethargy, drowsiness, anorexia, malaise, intermittent chest pain since 1 week. She denies fever, chills, diarrhea, vomiting, cough, dysuria or any other complain. As per daughter, patient also had an unwitnessed fall 2 months ago, hit her head and had a laceration of forehead. She is compliant to her medications including HCTZ. As per patient's daughter, Patient's BP is running high in 170/80 as her PCP has decreased her losartan from 100 mg to 50 mg    Patient was admitted in past for similar symptoms, had Sodium of 126 in the past, was raised to 136-138.     In ED, patient's /80, EKG shows NSR, Labs were remarkable for sodium of 128. Patient was given IVF, before sending UA (2018 19:55)   Patient seen and examined.     PAST MEDICAL & SURGICAL HISTORY:  Allergy  Depression  HTN (hypertension)  Hypercholesterolemia  Asthma  S/P hernia surgery      Allergies    No Known Allergies    Intolerances        MEDICATIONS  (STANDING):  atorvastatin 40 milliGRAM(s) Oral at bedtime  DULoxetine 60 milliGRAM(s) Oral daily  enoxaparin Injectable 40 milliGRAM(s) SubCutaneous daily  influenza   Vaccine 0.5 milliLiter(s) IntraMuscular once  losartan 50 milliGRAM(s) Oral daily  montelukast 10 milliGRAM(s) Oral at bedtime  pantoprazole    Tablet 40 milliGRAM(s) Oral before breakfast  sodium chloride 0.9%. 1000 milliLiter(s) (75 mL/Hr) IV Continuous <Continuous>      MEDICATIONS  (PRN):   Medications up to date at time of exam.    FAMILY HISTORY:  No pertinent family history in first degree relatives      SOCIAL HISTORY  Smoking History: [   ] smoking/smoke exposure, [   ] former smoker, [x  ] denies smoking  Living Condition: [   ] apartment, [   ] private house  Work History:   Travel History: denies recent travel  Illicit Substance Use: denies  Alcohol Use: denies    REVIEW OF SYSTEMS:    CONSTITUTIONAL:  denies fevers, chills, sweats, weight loss    HEENT:  denies diplopia or blurred vision, sore throat or runny nose.    CARDIOVASCULAR:  denies pressure, squeezing, tightness, or heaviness about the chest; no palpitations.    RESPIRATORY:  denies SOB, cough, BONILLA, wheezing.    GASTROINTESTINAL:  denies abdominal pain, nausea, vomiting or diarrhea.    GENITOURINARY: denies dysuria, frequency or urgency.    NEUROLOGIC:  denies numbness, tingling, seizures or weakness.    PSYCHIATRIC:  denies disorder of thought or mood.    MSK: denies swelling, redness      PHYSICAL EXAMINATION:    GENERAL: The patient is a well-developed, well-nourished, in no apparent distress.     Vital Signs Last 24 Hrs  T(C): 36.6 (2018 04:35), Max: 36.7 (2018 19:51)  T(F): 97.9 (2018 04:35), Max: 98.1 (2018 19:51)  HR: 66 (2018 04:35) (57 - 81)  BP: 133/74 (2018 04:35) (133/74 - 170/78)  BP(mean): --  RR: 18 (2018 04:35) (16 - 20)  SpO2: 99% (2018 04:35) (99% - 100%)   (if applicable)    Chest Tube (if applicable)    HEENT: Head is normocephalic and atraumatic. .    NECK: Supple, no palpable adenopathy.    LUNGS: Clear to auscultation, no wheezing, rales, or rhonchi.    HEART: Regular rate and rhythm without murmur.    ABDOMEN: Soft, nontender, and nondistended.  No hepatosplenomegaly is noted.    EXTREMITIES: Without any cyanosis, clubbing, rash, lesions or edema.    NEUROLOGIC: Awake, alert.    SKIN: Warm, dry, good turgor.      LABS:                        10.8   5.1   )-----------( 175      ( 2018 07:37 )             33.2         133<L>  |  102  |  15  ----------------------------<  89  4.7   |  26  |  0.89    Ca    8.3<L>      2018 07:37  Phos  3.0     -  Mg     2.1         TPro  7.6  /  Alb  3.6  /  TBili  0.6  /  DBili  x   /  AST  17  /  ALT  22  /  AlkPhos  75  -      Urinalysis Basic - ( 2018 18:49 )    Color: Yellow / Appearance: Clear / S.010 / pH: x  Gluc: x / Ketone: Negative  / Bili: Negative / Urobili: Negative   Blood: x / Protein: Negative / Nitrite: Negative   Leuk Esterase: Small / RBC: 2-5 /HPF / WBC 6-10 /HPF   Sq Epi: x / Non Sq Epi: Few /HPF / Bacteria: Trace /HPF          MICROBIOLOGY: (if applicable)    RADIOLOGY & ADDITIONAL STUDIES:  EKG:   CXR:    ECHO:    IMPRESSION: 78y Female PAST MEDICAL & SURGICAL HISTORY:  Allergy  Depression  HTN (hypertension)  Hypercholesterolemia  Asthma  S/P hernia surgery       77 y/o female from home Montserratian speaking daughter at bedside translating with PMH of HTn (HCTZ+Losartan) , HLD,  Depression, asthma was sent by PCP due to low sodium of 128. Patient daughter states that she went to her PCP where she was found to have a low sodium 128. Patient is AXO 3, complains of lethargy, drowsiness, anorexia, malaise, intermittent chest pain since 1 week. She denies fever, chills, diarrhea, vomiting, cough, dysuria or any other complain. As per daughter, patient also had an unwitnessed fall 2 months ago, hit her head and had a laceration of forehead. She is compliant to her medications including HCTZ. As per patient's daughter, Patient's BP is running high in 170/80 as her PCP has decreased her losartan from 100 mg to 50 mg    Patient was admitted in past for similar symptoms, had Sodium of 126 in the past, was raised to 136-138.     --    77 y/o female presents with low sodium from PMD's office. She is c/o occasional SOB, likely from asthma.    +Na improved, 133  +asthma  +mild elevation and slight blunting of L hemidiaphragm    Suggestion:   - bronchodilators, o2 supp prn    - DVT and GI prophylaxis.    - monitor electrolytes   - d/c planning as per primary team      Agree with above assessment and plan as transcribed.

## 2018-11-09 NOTE — CONSULT NOTE ADULT - ASSESSMENT
78 year-old woman with hyponatremia is most likely due to HCTZ and dehydration.  HTN with BP controlled.        Hyponatremia is most likely due to HCTZ and dehydration.  Agree with D/c HCTZ and would not restart in older patient as recurrence is likely.  Agree with IVF at current rate. Sodium is improving at appropriate rate.

## 2018-11-09 NOTE — DISCHARGE NOTE ADULT - NS AS ACTIVITY OBS
Dr. Nunez, I was unable to reach [place] at the number that was given, but I talked to her nephew and he said she is suppose to be coming back to [place].   
Janeth Malik is in [place], she is out of some of her medications, she has no way to get back to [place], her daughter is in the hospital. Janeth is [facility] to [facility] in [place].  
Please see if she is coming back. If not she need to get established locally. meds approved.      
activity as tolerated

## 2018-11-09 NOTE — DISCHARGE NOTE ADULT - HOSPITAL COURSE
77 y/o female from home [place] speaking daughter at bedside translating with PMH of HTn (HCTZ+Losartan), HLD,  Depression, asthma was sent by PCP due to low sodium of 128. Patient daughter states that she went to her PCP where she was found to have a low sodium 128. Patient is AXO 3, complains of lethargy, drowsiness, anorexia, malaise, intermittent chest pain since 1 week. She denies fever, chills, diarrhea, vomiting, cough, dysuria or any other complain. As per daughter, patient also had an unwitnessed fall 2 months ago, hit her head and had a laceration of forehead. She is compliant to her medications including HCTZ. As per patient's daughter, Patient's BP is running high in 170/80 as her PCP has decreased her losartan from 100 mg to 50 mg  Patient was admitted in past for similar symptoms, had Sodium of 126 in the past, was raised to 136-138.   In ED, patient's /80, EKG shows NSR, Labs were remarkable for sodium of 128. Patient was given IVF, before sending UA.  Patient was admitted for hyponatremia. Hyponatremia improved with IVF and patient was cleared for discharge home by medical attending. Patient and daughter given instruction to discontinue HCTZ and new Erx was sent to patients pharmacy for losartan

## 2018-11-09 NOTE — CONSULT NOTE ADULT - SUBJECTIVE AND OBJECTIVE BOX
Time of visit:    CHIEF COMPLAINT: Patient is a 78y old  Female who presents with a chief complaint of Sent from PCP clinic due to low sodium (2018 13:28)      HPI:  77 y/o female from home Syrian speaking daughter at bedside translating with PMH of HTn (HCTZ+Losartan) , HLD,  Depression, asthma was sent by PCP due to low sodium of 128. Patient daughter states that she went to her PCP where she was found to have a low sodium 128. Patient is AXO 3, complains of lethargy, drowsiness, anorexia, malaise, intermittent chest pain since 1 week. She denies fever, chills, diarrhea, vomiting, cough, dysuria or any other complain. As per daughter, patient also had an unwitnessed fall 2 months ago, hit her head and had a laceration of forehead. She is compliant to her medications including HCTZ. As per patient's daughter, Patient's BP is running high in 170/80 as her PCP has decreased her losartan from 100 mg to 50 mg    Patient was admitted in past for similar symptoms, had Sodium of 126 in the past, was raised to 136-138.     In ED, patient's /80, EKG shows NSR, Labs were remarkable for sodium of 128. Patient was given IVF, before sending UA (2018 19:55)   Patient seen and examined.     PAST MEDICAL & SURGICAL HISTORY:  Allergy  Depression  HTN (hypertension)  Hypercholesterolemia  Asthma  S/P hernia surgery      Allergies    No Known Allergies    Intolerances        MEDICATIONS  (STANDING):  atorvastatin 40 milliGRAM(s) Oral at bedtime  DULoxetine 60 milliGRAM(s) Oral daily  enoxaparin Injectable 40 milliGRAM(s) SubCutaneous daily  influenza   Vaccine 0.5 milliLiter(s) IntraMuscular once  losartan 50 milliGRAM(s) Oral daily  montelukast 10 milliGRAM(s) Oral at bedtime  pantoprazole    Tablet 40 milliGRAM(s) Oral before breakfast  sodium chloride 0.9%. 1000 milliLiter(s) (75 mL/Hr) IV Continuous <Continuous>      MEDICATIONS  (PRN):       Medications up to date at time of exam.    FAMILY HISTORY:  No pertinent family history in first degree relatives      SOCIAL HISTORY  Smoking History: [   ] smoking/smoke exposure, [   ] former smoker  Living Condition: [   ] apartment, [   ] private house  Work History:   Travel History: denies recent travel  Illicit Substance Use: denies  Alcohol Use: denies    REVIEW OF SYSTEMS:    CONSTITUTIONAL:  denies fevers, chills, sweats, weight loss    HEENT:  denies diplopia or blurred vision, sore throat or runny nose.    CARDIOVASCULAR:  denies pressure, squeezing, tightness, or heaviness about the chest; no palpitations.    RESPIRATORY:  denies SOB, cough, BONILLA, wheezing.    GASTROINTESTINAL:  denies abdominal pain, nausea, vomiting or diarrhea.    GENITOURINARY: denies dysuria, frequency or urgency.    NEUROLOGIC:  denies numbness, tingling, seizures or weakness.    PSYCHIATRIC:  denies disorder of thought or mood.    MSK: denies swelling, redness      PHYSICAL EXAMINATION:    GENERAL: The patient is a well-developed, well-nourished, in no apparent distress.     Vital Signs Last 24 Hrs  T(C): 36.8 (2018 14:18), Max: 36.8 (2018 14:18)  T(F): 98.3 (2018 14:18), Max: 98.3 (2018 14:18)  HR: 64 (2018 14:18) (57 - 66)  BP: 112/58 (2018 14:18) (112/58 - 146/70)  BP(mean): --  RR: 16 (2018 14:18) (16 - 20)  SpO2: 100% (2018 14:18) (99% - 100%)    HEENT: head is normocephalic and atraumatic. mucous membranes are moist.     NECK: supple, no palpable adenopathy.    LUNGS: clear to auscultation, no wheezing, rales, or rhonchi.    HEART: regular rate and rhythm without murmur.    ABDOMEN: soft, nontender, and nondistended.     EXTREMITIES: without any cyanosis, clubbing, rash, lesions or edema.    NEUROLOGIC: awake, alert, oriented.     SKIN: warm, dry, good turgor.      LABS:                        10.8   5.1   )-----------( 175      ( 2018 07:37 )             33.2         132<L>  |  102  |  17  ----------------------------<  126<H>  4.1   |  23  |  0.87    Ca    7.8<L>      2018 13:49  Phos  3.0       Mg     2.1         TPro  7.6  /  Alb  3.6  /  TBili  0.6  /  DBili  x   /  AST  17  /  ALT  22  /  AlkPhos  75        Urinalysis Basic - ( 2018 18:49 )    Color: Yellow / Appearance: Clear / S.010 / pH: x  Gluc: x / Ketone: Negative  / Bili: Negative / Urobili: Negative   Blood: x / Protein: Negative / Nitrite: Negative   Leuk Esterase: Small / RBC: 2-5 /HPF / WBC 6-10 /HPF   Sq Epi: x / Non Sq Epi: Few /HPF / Bacteria: Trace /HPF        .    MICROBIOLOGY: (if applicable)    RADIOLOGY & ADDITIONAL STUDIES:  EKG:   CXR:  ECHO:  TELE:  < from: CT Head No Cont (10.07.18 @ 17:37) >    EXAM:  CT BRAIN                            PROCEDURE DATE:  10/07/2018          INTERPRETATION:  CT scan of the brain without contrast    CLINICAL HISTORY: Vertigo after head injury    Comparison: 2018    TECHNIQUE:  Multi  detector spiral CT Scan of the Brain was performed   from the skull base to vertex without IV contrast administration in brain   and bone windows.    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     The ventricles present a similar appearance.  There is no midline shift   or mass effect.  There is no acute intracranial hemorrhage or extra-axial   fluid collection.  The cortical sulci and gyri are normal.  The medulla,   yudy and cerebellum are unremarkable. The bony calvaria are intact.  The   visualized paranasal sinuses are clear.    There is no evidence of acute infarction.    IMPRESSION:  No acute intracranial pathology.    Thank you for this referral.                CHAPARRO CORDERO M.D., ATTENDING RADIOLOGIST  This document has been electronically signed. Oct  7 2018  5:43PM                < end of copied text >      IMPRESSION: 78y Female PAST MEDICAL & SURGICAL HISTORY:  Allergy  Depression  HTN (hypertension)  Hypercholesterolemia  Asthma  S/P hernia surgery       77 y/o female from home Syrian speaking daughter at bedside translating with PMH of HTn (HCTZ+Losartan) , HLD,  Depression, asthma was sent by PCP due to low sodium of 128. Patient daughter states that she went to her PCP where she was found to have a low sodium 128. Patient is AXO 3, complains of lethargy, drowsiness, anorexia, malaise, intermittent chest pain since 1 week. She denies fever, chills, diarrhea, vomiting, cough, dysuria or any other complain. As per daughter, patient also had an unwitnessed fall 2 months ago, hit her head and had a laceration of forehead. She is compliant to her medications including HCTZ. As per patient's daughter,     Patient's BP is running high in 170/80 as her PCP has decreased her losartan from 100 mg to 50 mg      RECOMMENDATIONS:    Patient presents with low sodium from PMD. Reports uncontrolled BP since losartan decreased.   Transient increase in BP most likely secondary to electrolyte imbalance, which is now improved.  BP at present time well controlled. Continue with present meds. Out patient cardiology f/u.

## 2018-11-09 NOTE — CONSULT NOTE ADULT - ATTENDING COMMENTS
[facility] NEPHROLOGY  Eddie Walker M.D.  Anam Plascencia D.O.  Caridad Reich M.D.  Suzanna Esqueda, MSN, ANP-C    Telephone: [phone]  Facsimile: [phone]    [address] [place] [address]

## 2018-11-09 NOTE — CONSULT NOTE ADULT - SUBJECTIVE AND OBJECTIVE BOX
Pt interviewed and examined. Full note to follow.    Hyponatremia is most likely due to HCTZ and dehydration.  Agree with D/c HCTZ and would not restart in older patient as recurrence is likely.  Agree with IVF at current rate. Sodium is improving at appropriate rate. [facility] NEPHROLOGY- CONSULTATION NOTE    Patient is a 78y Female who presented to her PMD with lethargy, drowsiness, anorexia, malaise, and was found to have hyponatremia, Na+ 128.  Pt was sent to hospital  She is on Losartan-HCTZ for HTN.  She was started on IVF NS and feels better today.     PAST MEDICAL & SURGICAL HISTORY:  Allergy  Depression  HTN (hypertension)  Hypercholesterolemia  Asthma  S/P hernia surgery    No Known Allergies    Home Medications Reviewed  Hospital Medications:   MEDICATIONS  (STANDING):  atorvastatin 40 milliGRAM(s) Oral at bedtime  DULoxetine 60 milliGRAM(s) Oral daily  enoxaparin Injectable 40 milliGRAM(s) SubCutaneous daily  influenza   Vaccine 0.5 milliLiter(s) IntraMuscular once  losartan 50 milliGRAM(s) Oral daily  montelukast 10 milliGRAM(s) Oral at bedtime  pantoprazole    Tablet 40 milliGRAM(s) Oral before breakfast  sodium chloride 0.9%. 1000 milliLiter(s) (75 mL/Hr) IV Continuous <Continuous>    SOCIAL HISTORY:  Denies ETOh,Smoking,   FAMILY HISTORY:  No pertinent family history in first degree relatives    REVIEW OF SYSTEMS:  CONSTITUTIONAL: No weakness, fevers or chills  EYES/ENT: No visual changes;  No vertigo or throat pain   NECK: No pain or stiffness  RESPIRATORY: No cough, wheezing, hemoptysis; No shortness of breath  CARDIOVASCULAR: No chest pain or palpitations.  GASTROINTESTINAL: No abdominal or epigastric pain. No nausea, vomiting, or hematemesis; No diarrhea or constipation. No melena or hematochezia.  GENITOURINARY: No dysuria, frequency, foamy urine, urinary urgency, incontinence or hematuria  NEUROLOGICAL: No numbness or weakness  SKIN: No itching, burning, rashes, or lesions   VASCULAR: No bilateral lower extremity edema.   All other review of systems is negative unless indicated above.    VITALS:  T(F): 98.3 (18 @ 14:18), Max: 98.3 (18 @ 14:18)  HR: 64 (18 @ 14:18)  BP: 112/58 (18 @ 14:18)  RR: 16 (18 @ 14:18)  SpO2: 100% (18 @ 14:18)  Wt(kg): --      PHYSICAL EXAM:  Constitutional: NAD  HEENT: anicteric sclera, oropharynx clear, MMM  Neck: No JVD  Respiratory: CTAB, no wheezes, rales or rhonchi  Cardiovascular: S1, S2, RRR  Gastrointestinal: BS+, soft, NT/ND  Extremities: No cyanosis or clubbing. No peripheral edema  Neurological: A/O x 3, no focal deficits  Psychiatric: Normal mood, normal affect  : No CVA tenderness. No russell.   Skin: No rashes    LABS:      132<L>  |  102  |  17  ----------------------------<  126<H>  4.1   |  23  |  0.87    Ca    7.8<L>      2018 13:49  Phos  3.0       Mg     2.1         TPro  7.6  /  Alb  3.6  /  TBili  0.6  /  DBili      /  AST  17  /  ALT  22  /  AlkPhos  75      Creatinine Trend: 0.87 <--, 0.89 <--, 1.10 <--, 1.12 <--                        10.8   5.1   )-----------( 175      ( 2018 07:37 )             33.2     Urine Studies:  Urinalysis Basic - ( 2018 18:49 )    Color: Yellow / Appearance: Clear / S.010 / pH:   Gluc:  / Ketone: Negative  / Bili: Negative / Urobili: Negative   Blood:  / Protein: Negative / Nitrite: Negative   Leuk Esterase: Small / RBC: 2-5 /HPF / WBC 6-10 /HPF   Sq Epi:  / Non Sq Epi: Few /HPF / Bacteria: Trace /HPF        RADIOLOGY & ADDITIONAL STUDIES:      < from: Xray Chest 1 View AP/PA (10.07.18 @ 17:29) >    EXAM:  XR CHEST AP OR PA 1V                            PROCEDURE DATE:  10/07/2018          INTERPRETATION:  Chest one view    HISTORY: Lightheadedness    COMPARISON STUDY: None available    Frontal expiratory view of the chest shows the heart to be normal in   size. The lungs are clear and there is no evidence of pneumothorax nor   pleural effusion. There is mild elevation of the left hemidiaphragm.    IMPRESSION:  No active pulmonary disease.    Thank you for the courtesy of this referral.                CHAPARRO GIL M.D., ATTENDING RADIOLOGIST  This document has been electronically signed. Oct  7 2018  5:34PM                < end of copied text >

## 2018-11-09 NOTE — DISCHARGE NOTE ADULT - MEDICATION SUMMARY - MEDICATIONS TO CHANGE
I will SWITCH the dose or number of times a day I take the medications listed below when I get home from the hospital:    losartan-hydrochlorothiazide 100mg-12.5mg oral tablet  -- 1 tab(s) by mouth once a day

## 2018-11-09 NOTE — PROGRESS NOTE ADULT - SUBJECTIVE AND OBJECTIVE BOX
Patient seen and examined at bedside  No acute events noted overnight  Case discussed with medical team    HPI:  79 y/o female from home Ghanaian speaking daughter at bedside translating with PMH of HTn (HCTZ+Losartan) , HLD,  Depression, asthma was sent by PCP due to low sodium of 128. Patient daughter states that she went to her PCP where she was found to have a low sodium 128. Patient is AXO 3, complains of lethargy, drowsiness, anorexia, malaise, intermittent chest pain since 1 week. She denies fever, chills, diarrhea, vomiting, cough, dysuria or any other complain. As per daughter, patient also had an unwitnessed fall 2 months ago, hit her head and had a laceration of forehead. She is compliant to her medications including HCTZ. As per patient's daughter, Patient's BP is running high in 170/80 as her PCP has decreased her losartan from 100 mg to 50 mg    Patient was admitted in past for similar symptoms, had Sodium of 126 in the past, was raised to 136-138.     In ED, patient's /80, EKG shows NSR, Labs were remarkable for sodium of 128. Patient was given IVF, before sending UA (2018 19:55)      PAST MEDICAL & SURGICAL HISTORY:  Allergy  Depression  HTN (hypertension)  Hypercholesterolemia  Asthma  S/P hernia surgery      No Known Allergies       MEDICATIONS  (STANDING):  atorvastatin 40 milliGRAM(s) Oral at bedtime  DULoxetine 60 milliGRAM(s) Oral daily  enoxaparin Injectable 40 milliGRAM(s) SubCutaneous daily  influenza   Vaccine 0.5 milliLiter(s) IntraMuscular once  losartan 50 milliGRAM(s) Oral daily  montelukast 10 milliGRAM(s) Oral at bedtime  pantoprazole    Tablet 40 milliGRAM(s) Oral before breakfast  sodium chloride 0.9%. 1000 milliLiter(s) (75 mL/Hr) IV Continuous <Continuous>    MEDICATIONS  (PRN):      REVIEW OF SYSTEMS:  CONSTITUTIONAL: (+) malaise.   EYES: No acute change in vision   ENT:  No tinnitus  NECK: No stiffness  RESPIRATORY: [place]. No hemoptysis  CARDIOVASCULAR: No chest pain, palpitations, syncope  GASTROINTESTINAL: No hematemesis, diarrhea, melena, or hematochezia.  GENITOURINARY: No hematuria  NEUROLOGICAL: No headaches  LYMPH Nodes: No enlarged glands  ENDOCRINE: No heat or cold intolerance	    T(C): 36.6 (18 @ 04:35), Max: 36.7 (18 @ 19:51)  HR: 66 (18 @ 04:35) (57 - 81)  BP: 133/74 (18 @ 04:35) (133/74 - 170/78)  RR: 18 (18 @ 04:35) (16 - 20)  SpO2: 99% (18 @ 04:35) (99% - 100%)    PHYSICAL EXAMINATION:   Constitutional: WD, NAD  HEENT: NC, AT  Neck:  Supple  Respiratory:  Adequate airflow b/l. Not using accessory muscles of respiration.  Cardiovascular:  S1 & S2 intact, no R/G, 2+ radial pulses b/l  Gastrointestinal: Soft, NT, ND, normoactive b.s., no organomegaly/RT/rigidity  Extremities: WWP  Neurological:  Alert and awake.  No acute focal motor deficits. Crude sensation intact.     Labs and imaging reviewed    LABS:                        10.8   5.1   )-----------( 175      ( 2018 07:37 )             33.2         133<L>  |  102  |  15  ----------------------------<  89  4.7   |  26  |  0.89    Ca    8.3<L>      2018 07:37  Phos  3.0       Mg     2.1         TPro  7.6  /  Alb  3.6  /  TBili  0.6  /  DBili  x   /  AST  17  /  ALT  22  /  AlkPhos  75            Urinalysis Basic - ( 2018 18:49 )    Color: Yellow / Appearance: Clear / S.010 / pH: x  Gluc: x / Ketone: Negative  / Bili: Negative / Urobili: Negative   Blood: x / Protein: Negative / Nitrite: Negative   Leuk Esterase: Small / RBC: 2-5 /HPF / WBC 6-10 /HPF   Sq Epi: x / Non Sq Epi: Few /HPF / Bacteria: Trace /HPF      CAPILLARY BLOOD GLUCOSE            LIVER FUNCTIONS - ( 2018 16:52 )  Alb: 3.6 g/dL / Pro: 7.6 g/dL / ALK PHOS: 75 U/L / ALT: 22 U/L DA / AST: 17 U/L / GGT: x               RADIOLOGY & ADDITIONAL STUDIES:

## 2018-11-09 NOTE — DISCHARGE NOTE ADULT - PATIENT PORTAL LINK FT
You can access the [facility] Patient Portal, offered by [facility], by registering with the following website: [email or web address]

## 2018-11-09 NOTE — PROGRESS NOTE ADULT - ASSESSMENT
79 y/o female from home Filipino speaking daughter at bedside translating with PMH of HTn (HCTZ+Losartan) , HLD,  Depression, asthma was sent by PCP due to low sodium of 128. Patient daughter states that she went to her PCP where she was found to have a low sodium 128. Patient is AXO 3, complains of lethargy, drowsiness, anorexia, malaise, intermittent chest pain since 1 week. She denies fever, chills, diarrhea, vomiting, cough, dysuria or any other complain. As per daughter, patient also had an unwitnessed fall 2 months ago, hit her head and had a laceration of forehead. She is compliant to her medications including HCTZ. As per patient's daughter, Patient's BP is running high in 170/80 as her PCP has decreased her losartan from 100 mg to 50 mg    Patient was admitted in past for similar symptoms, had Sodium of 126 in the past, was raised to 136-138.     In ED, patient's /80, EKG shows NSR, Labs were remarkable for sodium of 128. Patient was given IVF, before sending UA

## 2018-11-09 NOTE — CONSULT NOTE ADULT - REASON FOR ADMISSION
Sent from PCP clinic due to low sodium

## 2019-08-08 NOTE — ED PROVIDER NOTE - DISCHARGE DATE
11-Dec-2017 Dorsal Nasal Flap Text: The defect edges were debeveled with a #15 scalpel blade.  Given the location of the defect and the proximity to free margins a dorsal nasal flap was deemed most appropriate.  Using a sterile surgical marker, an appropriate dorsal nasal flap was drawn around the defect.    The area thus outlined was incised deep to adipose tissue with a #15 scalpel blade.  The skin margins were undermined to an appropriate distance in all directions utilizing iris scissors.

## 2020-02-12 ENCOUNTER — EMERGENCY (EMERGENCY)
Facility: HOSPITAL | Age: 80
LOS: 1 days | Discharge: HOME | End: 2020-02-12
Attending: EMERGENCY MEDICINE
Payer: MEDICARE

## 2020-02-12 VITALS
SYSTOLIC BLOOD PRESSURE: 168 MMHG | HEART RATE: 66 BPM | OXYGEN SATURATION: 97 % | HEIGHT: 55 IN | TEMPERATURE: 97 F | WEIGHT: 134.92 LBS | DIASTOLIC BLOOD PRESSURE: 70 MMHG | RESPIRATION RATE: 16 BRPM

## 2020-02-12 DIAGNOSIS — Z98.890 OTHER SPECIFIED POSTPROCEDURAL STATES: Chronic | ICD-10-CM

## 2020-02-12 PROCEDURE — 99284 EMERGENCY DEPT VISIT MOD MDM: CPT

## 2020-02-12 RX ORDER — MECLIZINE HCL 12.5 MG
25 TABLET ORAL ONCE
Refills: 0 | Status: COMPLETED | OUTPATIENT
Start: 2020-02-12 | End: 2020-02-12

## 2020-02-12 NOTE — ED ADULT NURSE NOTE - OBJECTIVE STATEMENT
Pt from home with pmhx.HTN, c/o elevated BP at home(180/90). Denies pain, nausea vomiting fever chill. no acute distress noted.

## 2020-02-12 NOTE — ED ADULT NURSE NOTE - NSIMPLEMENTINTERV_GEN_ALL_ED
Implemented All Universal Safety Interventions:  [place] to call system. Call bell, personal items and telephone within reach. Instruct patient to call for assistance. Room bathroom lighting operational. Non-slip footwear when patient is off stretcher. Physically safe environment: no spills, clutter or unnecessary equipment. Stretcher in lowest position, wheels locked, appropriate side rails in place.

## 2020-02-12 NOTE — ED ADULT NURSE NOTE - CAS DISCH CONDITION
CF (cystic fibrosis)  bronchoscopy; 7/2014 MLB and injection laryngoplasty  PICC (peripherally inserted central catheter) removal  last 2/2018 in IR at [facility]
Stable

## 2020-02-13 PROCEDURE — 93005 ELECTROCARDIOGRAM TRACING: CPT

## 2020-02-13 PROCEDURE — 70450 CT HEAD/BRAIN W/O DYE: CPT | Mod: 26

## 2020-02-13 PROCEDURE — 70450 CT HEAD/BRAIN W/O DYE: CPT

## 2020-02-13 PROCEDURE — 99284 EMERGENCY DEPT VISIT MOD MDM: CPT | Mod: 25

## 2020-02-13 RX ADMIN — Medication 25 MILLIGRAM(S): at 00:19

## 2020-02-13 NOTE — ED PROVIDER NOTE - CLINICAL SUMMARY MEDICAL DECISION MAKING FREE TEXT BOX
78 yo F with elevated BP reading. Vague c/o of dizziness, but normal neuro exam. Will check EKG, labs, meclizine, reassess.

## 2020-02-13 NOTE — ED PROVIDER NOTE - OBJECTIVE STATEMENT
80 yo F pmh of HTN, HLD, and asthma presents with elevated BP readings at home today. Took extra dose of losartan this afternoon. Vague c/o dizziness today. Denies HA, syncope, CP, sob, palpitations, visual changes, other acute complaints.

## 2020-02-13 NOTE — ED PROVIDER NOTE - PROGRESS NOTE DETAILS
EKG - nsr, rate 63, no ischemic changes, no ectopy   CT head - no ICH  Feeling well. Ambulating around ED with steady gait. BP improved. Has PCP fu tomorrow. Repeat neuro exam wnl. Discussed indications for patient return to ED. Patient understood.

## 2020-02-13 NOTE — ED PROVIDER NOTE - PATIENT PORTAL LINK FT
You can access the FollowMyHealth Patient Portal offered by [facility] by registering at the following website: [email or web address]. By joining [employer]’s FollowMyHealth portal, you will also be able to view your health information using other applications (apps) compatible with our system.

## 2020-02-13 NOTE — ED PROVIDER NOTE - NSFOLLOWUPINSTRUCTIONS_ED_ALL_ED_FT
HOW TO TAKE A BLOOD PRESSURE - [facility](R) Instructions(ER/ED)     How to Take a Blood Pressure     WHAT YOU NEED TO KNOW:    Blood pressure (BP) is the force or pressure that blood puts on the walls of your arteries as it goes through your body. BP readings are usually written as 2 numbers. The first or top number is called systolic BP. The second or bottom number is called diastolic BP. Normal BP is less than 120/80.     DISCHARGE INSTRUCTIONS:    Contact your healthcare provider if:     Your BP is higher or lower than your healthcare provider has told you it should be.       You have questions or concerns about your condition or care.     Why you may need to take your BP: You may need to take your BP at home if have hypertension (high BP) or hypotension (low BP). High BP increases your risk for stroke, heart attack, or kidney disease. Low BP may decrease blood flow to your organs, such as your brain and kidneys. This can damage your organs. You may need to take medicine to keep your BP at a normal level. Your healthcare provider can use the BP readings you take at home to make sure that your BP medicines are working. Ask your healthcare provider what your BP should be.     How to check your BP: You can take your BP at home with a digital BP monitor. Read the instructions that came with your BP monitor. The monitor comes with an adjustable cuff. Ask your healthcare provider if your cuff is the correct size. A cuff that is too small will cause a falsely high blood pressure. A cuff that is too big will cause a falsely low blood pressure.     Do not take a BP reading in an arm that is injured or has an IV or shunt.       Do not check your blood pressure within 30 minutes of smoking, drinking coffee, or exercising. These may affect your BP reading.      Rest quietly for 5 minutes before you take your BP.       Sit with your feet flat on the floor and your back against a chair.       Extend your arm and support it on a flat surface. Your arm should be at the same level as your heart.       Make sure all of the air is out of the cuff. Put the cuff about 1 inch (2.5 cm) above your elbow. Wrap the cuff snugly around your arm. The BP reading may not be correct if the cuff is too loose.       If you are using a wrist cuff, wrap the cuff snugly around your wrist. Hold your wrist at the same level as your heart.       Turn on the BP monitor and follow the directions.       Write down your BP, the date, the time, and which arm you used to take the BP. Take your blood pressure twice and write down both readings. These BP readings can be 1 minute apart. How to take a Blood Pressure         How often to take your BP: Your healthcare provider may recommend that you take your BP at least twice a day. Take your BP at the same times each day, such as the morning and evening. Ask your healthcare provider when and how often you should take your BP.    What else you need to know:     Your healthcare provider will tell you what your BP should be. A BP of 120/80 may not be your goal.       Take your BP medicines as directed. Do not stop taking your medicines if your blood pressure is at your goal. A blood pressure at your goal means your medicine is working correctly.       Keep a log of your BP readings and bring it to your follow-up visits.       Bring your BP monitor to your follow-up visit. Your healthcare provider can check that you are using the monitor correctly.     Follow up with your healthcare provider as directed: Write down your questions so you remember to ask them during your visits.        © Copyright [employer] 2020       back to top                      © Copyright [employer] 2020

## 2020-02-13 NOTE — ED PROVIDER NOTE - NS ED ROS FT
CONSTITUTIONAL: no fever, no chills   EYES: no visual changes, no eye pain   ENMT: no nasal congestion, no throat pain  CARDIOVASCULAR: no chest pain, no edema, no palpitations   RESPIRATORY: no shortness of breath, no cough   GASTROINTESTINAL: no abdominal pain, no nausea, no vomiting, no diarrhea, no constipation   GENITOURINARY: no dysuria, no frequency  MUSCULOSKELETAL: no joint pains, no myalgias, no back pain   SKIN: no rashes  NEUROLOGICAL: no weakness, no headache, +dizziness, no slurred speech, no syncope   PSYCHIATRIC: no known mental health illness   HEME/LYMPH: no lymphadenopathy      All other ROS negative except as per HPI

## 2020-02-13 NOTE — ED PROVIDER NOTE - PHYSICAL EXAMINATION
GENERAL: well appearing, no acute distress   HEAD: atraumatic   EYES: EOMI, pink conjunctiva   ENT: moist oral mucosa   CARDIAC: RRR, no edema, distal pulses present   RESPIRATORY: lungs CTAB, no increased work of breathing   GASTROINTESTINAL: no abdominal tenderness, no rebound or guarding, bowel sounds presents  GENITOURINARY: no CVA tenderness   MUSCULOSKELETAL: no deformity   NEUROLOGICAL: AAOx3, CN's II-XII intact, strength 5/5 bilateral UE and LE, sensation intact to light touch, finger to nose intact, steady gait  SKIN: intact   PSYCHIATRIC: cooperative  HEME LYMPH: no lymphadenopathy

## 2020-07-25 NOTE — CONSULT NOTE ADULT - ATTENDING COMMENTS
Patient seen and examined.  Agree with above.   -BP improved after antihypertensive medications  -check tte to assess LV function    Marline Mary MD Patient seen and examined.  Agree with above.   -BP improved after antihypertensive medications  -check tte to assess LV function - can be done as outpatient     Marline Mary MD 1.76

## 2021-06-04 NOTE — ED ADULT TRIAGE NOTE - AS TEMP SITE
Orthopedic Service Discharge Summary    Patient ID:  Hoang Ruiz  [id number]  male  76 y.o.  1944    Admit date: 6/1/2021    Discharge date and time: No discharge date for patient encounter. 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]  Back stiffness [M25.69]  Degeneration of lumbar intervertebral disc [M51.36]  History of spinal arthrodesis [Z98.1]  Lumbar stenosis with neurogenic claudication [M48.062]    Postoperative Diagnosis: Lumbar adjacent segment disease with spondylolisthesis [M51.36, M43.16]  Bilateral radiating leg pain [M54.10]  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 PMH:   Past Medical History:   Diagnosis Date    CAD (coronary artery disease)     COVID-19 vaccine series completed 01/2021    Zachary Rincon    Degenerative joint disease 1/28/2011    Diabetes mellitus ([facility] [address]     Essential hypertension     History of back surgery 09/2019    Hyperlipidemia     Kidney stones 1969    ADELA on CPAP 01/28/2011       Medications upon admission :   Prior to Admission Medications   Prescriptions Last Dose Informant Patient Reported? Taking?   acetaminophen (Tylenol Arthritis Pain) 650 mg TbER 5/30/2021  Yes Yes   Sig: Take 1,300 mg by mouth every eight (8) hours as needed for Pain. ascorbic acid, vitamin C, (VITAMIN C) 500 mg tablet 5/25/2021 at Unknown time  Yes Yes   Sig: Take 500 mg by mouth two (2) times a day. aspirin delayed-release 81 mg tablet 5/26/2021 at Unknown time  Yes Yes   Sig: Take 81 mg by mouth every morning. atorvastatin (LIPITOR) 40 mg tablet 6/1/2021 at 0700  No Yes   Sig: TAKE 1 TABLET DAILY (START LIPITOR AFTER VYTORIN IS FINISHED)   calcium carbonate/vitamin D3 (CALCIUM WITH VITAMIN D3 PO) 5/25/2021 at Unknown time  Yes Yes   Sig: Take 1 Tablet by mouth two (2) times a day. clopidogreL (PLAVIX) 75 mg tab 5/26/2021  Yes Yes   Sig: Take 75 mg by mouth every morning. folic acid (FOLVITE) 1 mg tablet   Yes Yes   Sig: Take 1 mg by mouth daily. Indications: iron deficiency   irbesartan (Avapro) 150 mg tablet 5/31/2021 at 1900  Yes Yes   Sig: Take 150 mg by mouth two (2) times a day.    isosorbide mononitrate ER (IMDUR) 30 mg tablet 6/1/2021 at 0700  Yes Yes   Sig: Take 30 mg by mouth every morning. metFORMIN (GLUCOPHAGE) 1,000 mg tablet 5/31/2021 at 1900  No Yes   Sig: TAKE 1 TABLET TWICE A DAY WITH MEALS   multivitamins chew 5/25/2021 at Unknown time  Yes Yes   Sig: Take 1 Dose by mouth every morning. 1 dose is two tablets   omeprazole (PRILOSEC) 40 mg capsule 6/1/2021 at 0700  Yes Yes   Sig: Take 40 mg by mouth every morning. oxyCODONE-acetaminophen (PERCOCET) 5-325 mg per tablet 6/1/2021 at 0700  No Yes   Sig: Take 1 Tab by mouth daily as needed for Pain for up to 30 days. Indications: pain   ranolazine ER (RANEXA) 500 mg SR tablet 6/1/2021 at 0700  No Yes   Sig: TAKE 1 TABLET TWICE A DAY   tamsulosin (FLOMAX) 0.4 mg capsule 6/1/2021 at 0700  Yes Yes   Sig: Take 0.4 mg by mouth every morning. Facility-Administered Medications: None        Allergies: Allergies   Allergen Reactions    Ace Inhibitors Angioedema     Face        Hospital Course: The patient underwent surgery. Complications:  None; patient tolerated the procedure well. Was taken to the PACU in stable condition and then transferred to the Orthopedics floor. Perioperative Antibiotics:  Ancef     Postoperative Pain Management:  Acetaminophen and Oxycodone   DVT Prophylaxis: LMW heparin     Postoperative transfusions:     2 Banked PRBCs     Post Op complications: none    Hemoglobin at discharge:    Lab Results   Component Value Date/Time    HGB 7.6 (L) 06/03/2021 02:07 AM    INR 1.1 05/24/2021 10:01 AM        POD # 1. Incision - clean, dry and intact. No significant erythema or swelling. Neurovascular exam within normal limits. Wound appears to be healing without any evidence of infection. Pre op back pain improved, post op pain is controlled with medications. Hemoglobin is 5.9 on POD#1, and 2 units RBCs transfused. POD #2-     Incision Clean and dry, Pre op low back pain improved,  Strength improved. Post op pain is controlled. Pt had a HVAC drain the was removed on day 2.  Prophylactic Lovenox started on POD #2. Hemoglobin is  Increased to 7.9  With 2 units Blood transfusion. Physical Therapy started on the day following surgery and progressed to ambulation with the aid of a rolling walker for distances of 100 feet with  Contact guard assistance. . . At the time of discharge,  Pre op  Back and leg pain is improved, post op pain is controlled with medications,  Neuro intact, able to go up and down stairs and had understanding of precautions needed following surgery. He was discharged on POD #3. Becky Michaels DM is controlled with correctional insulin  In the hospital,   Guzman kept during hospital stay and discharged home with Guzman as per Urology recommendation, due to difficult guzman insertion intra op. He was  Discharged home on PO antibiotics. Out patient Urology follow up scheduled. Discharged to: Home. Discharge instructions:  -See Full Summary of discharge instructions attached  -Anticoagulate with LMW heparin, will resume plavix after completing Lovenox.  -Resume pre hospital diet            -Resume home medications   Current Discharge Medication List      START taking these medications    Details   acetaminophen (TYLENOL) 500 mg tablet Take 2 Tablets by mouth every six (6) hours. Qty: 90 Tablet, Refills: 0  Start date: 6/4/2021      cyclobenzaprine (FLEXERIL) 10 mg tablet Take 1 Tablet by mouth two (2) times daily as needed for Muscle Spasm(s). Qty: 60 Tablet, Refills: 0  Start date: 6/4/2021      oxyCODONE IR (ROXICODONE) 5 mg immediate release tablet Take 1-2 Tablets by mouth every six (6) hours as needed for Pain for up to 3 days. Max Daily Amount: 40 mg.  Qty: 56 Tablet, Refills: 0  Start date: 6/4/2021, End date: 6/7/2021    Associated Diagnoses: Status post lumbar spinal fusion      enoxaparin (Lovenox) 40 mg/0.4 mL 0.4 mL by SubCUTAneous route daily.   Qty: 13 Syringe, Refills: 0  Start date: 6/4/2021         CONTINUE these medications which have NOT CHANGED    Details   omeprazole (PRILOSEC) 40 mg capsule Take 40 mg by mouth every morning. calcium carbonate/vitamin D3 (CALCIUM WITH VITAMIN D3 PO) Take 1 Tablet by mouth two (2) times a day. irbesartan (Avapro) 150 mg tablet Take 150 mg by mouth two (2) times a day. tamsulosin (FLOMAX) 0.4 mg capsule Take 0.4 mg by mouth every morning. metFORMIN (GLUCOPHAGE) 1,000 mg tablet TAKE 1 TABLET TWICE A DAY WITH MEALS  Qty: 180 Tab, Refills: 3    Associated Diagnoses: Diabetes mellitus type 2 in nonobese ([facility])      atorvastatin (LIPITOR) 40 mg tablet TAKE 1 TABLET DAILY (START LIPITOR AFTER VYTORIN IS FINISHED)  Qty: 90 Tab, Refills: 3      ranolazine ER (RANEXA) 500 mg SR tablet TAKE 1 TABLET TWICE A DAY  Qty: 60 Tab, Refills: 3    Associated Diagnoses: Coronary artery disease of native artery of native heart with stable angina pectoris ([facility])      isosorbide mononitrate ER (IMDUR) 30 mg tablet Take 30 mg by mouth every morning. ascorbic acid, vitamin C, (VITAMIN C) 500 mg tablet Take 500 mg by mouth two (2) times a day. multivitamins chew Take 1 Dose by mouth every morning.  1 dose is two tablets         STOP taking these medications       clopidogreL (PLAVIX) 75 mg tab Comments:   Reason for Stopping:         acetaminophen (Tylenol Arthritis Pain) 650 mg TbER Comments:   Reason for Stopping:         oxyCODONE-acetaminophen (PERCOCET) 5-325 mg per tablet Comments:   Reason for Stopping:         aspirin delayed-release 81 mg tablet Comments:   Reason for Stopping:            per medical continuation form  -Discharge activity: Activity as tolerated, Ambulate in house, No driving while on analgesics, PT/OT per Home Health and See surgical instructions  -Ambulate with Walkers, Type: Rolling Walker, -Wound Care Keep wound clean and dry, Reinforce dressing PRN, Ice to area for comfort and As directed  -Follow up in office in 2 weeks  - Follow up in Urology  Next week      Signed:  Delaney Crigler, NP  6/4/2021  7:24 AM        Ethan Kaur, MD oral

## 2021-10-02 ENCOUNTER — INPATIENT (INPATIENT)
Facility: HOSPITAL | Age: 81
LOS: 2 days | Discharge: HOME | DRG: 690 | End: 2021-10-05
Attending: INTERNAL MEDICINE | Admitting: INTERNAL MEDICINE
Payer: MEDICARE

## 2021-10-02 VITALS
HEIGHT: 55 IN | WEIGHT: 134.92 LBS | OXYGEN SATURATION: 98 % | RESPIRATION RATE: 20 BRPM | HEART RATE: 70 BPM | DIASTOLIC BLOOD PRESSURE: 83 MMHG | SYSTOLIC BLOOD PRESSURE: 154 MMHG | TEMPERATURE: 98 F

## 2021-10-02 DIAGNOSIS — Z98.890 OTHER SPECIFIED POSTPROCEDURAL STATES: Chronic | ICD-10-CM

## 2021-10-02 LAB
ALBUMIN SERPL ELPH-MCNC: 2.8 G/DL — LOW (ref 3.5–5)
ALP SERPL-CCNC: 71 U/L — SIGNIFICANT CHANGE UP (ref 40–120)
ALT FLD-CCNC: 24 U/L DA — SIGNIFICANT CHANGE UP (ref 10–60)
ANION GAP SERPL CALC-SCNC: 5 MMOL/L — SIGNIFICANT CHANGE UP (ref 5–17)
APPEARANCE UR: CLEAR — SIGNIFICANT CHANGE UP
AST SERPL-CCNC: 24 U/L — SIGNIFICANT CHANGE UP (ref 10–40)
BASOPHILS # BLD AUTO: 0.02 K/UL — SIGNIFICANT CHANGE UP (ref 0–0.2)
BASOPHILS NFR BLD AUTO: 0.5 % — SIGNIFICANT CHANGE UP (ref 0–2)
BILIRUB SERPL-MCNC: 0.4 MG/DL — SIGNIFICANT CHANGE UP (ref 0.2–1.2)
BILIRUB UR-MCNC: NEGATIVE — SIGNIFICANT CHANGE UP
BUN SERPL-MCNC: 17 MG/DL — SIGNIFICANT CHANGE UP (ref 7–18)
CALCIUM SERPL-MCNC: 8.5 MG/DL — SIGNIFICANT CHANGE UP (ref 8.4–10.5)
CHLORIDE SERPL-SCNC: 106 MMOL/L — SIGNIFICANT CHANGE UP (ref 96–108)
CO2 SERPL-SCNC: 29 MMOL/L — SIGNIFICANT CHANGE UP (ref 22–31)
COLOR SPEC: YELLOW — SIGNIFICANT CHANGE UP
CREAT SERPL-MCNC: 0.91 MG/DL — SIGNIFICANT CHANGE UP (ref 0.5–1.3)
DIFF PNL FLD: ABNORMAL
EOSINOPHIL # BLD AUTO: 0.05 K/UL — SIGNIFICANT CHANGE UP (ref 0–0.5)
EOSINOPHIL NFR BLD AUTO: 1.2 % — SIGNIFICANT CHANGE UP (ref 0–6)
GLUCOSE SERPL-MCNC: 98 MG/DL — SIGNIFICANT CHANGE UP (ref 70–99)
GLUCOSE UR QL: NEGATIVE — SIGNIFICANT CHANGE UP
HCT VFR BLD CALC: 36.2 % — SIGNIFICANT CHANGE UP (ref 34.5–45)
HGB BLD-MCNC: 11.8 G/DL — SIGNIFICANT CHANGE UP (ref 11.5–15.5)
IMM GRANULOCYTES NFR BLD AUTO: 0.2 % — SIGNIFICANT CHANGE UP (ref 0–1.5)
KETONES UR-MCNC: NEGATIVE — SIGNIFICANT CHANGE UP
LEUKOCYTE ESTERASE UR-ACNC: ABNORMAL
LYMPHOCYTES # BLD AUTO: 1.42 K/UL — SIGNIFICANT CHANGE UP (ref 1–3.3)
LYMPHOCYTES # BLD AUTO: 34.1 % — SIGNIFICANT CHANGE UP (ref 13–44)
MCHC RBC-ENTMCNC: 28.6 PG — SIGNIFICANT CHANGE UP (ref 27–34)
MCHC RBC-ENTMCNC: 32.6 GM/DL — SIGNIFICANT CHANGE UP (ref 32–36)
MCV RBC AUTO: 87.9 FL — SIGNIFICANT CHANGE UP (ref 80–100)
MONOCYTES # BLD AUTO: 0.67 K/UL — SIGNIFICANT CHANGE UP (ref 0–0.9)
MONOCYTES NFR BLD AUTO: 16.1 % — HIGH (ref 2–14)
NEUTROPHILS # BLD AUTO: 2 K/UL — SIGNIFICANT CHANGE UP (ref 1.8–7.4)
NEUTROPHILS NFR BLD AUTO: 47.9 % — SIGNIFICANT CHANGE UP (ref 43–77)
NITRITE UR-MCNC: NEGATIVE — SIGNIFICANT CHANGE UP
NRBC # BLD: 0 /100 WBCS — SIGNIFICANT CHANGE UP (ref 0–0)
PH UR: 5 — SIGNIFICANT CHANGE UP (ref 5–8)
PLATELET # BLD AUTO: 156 K/UL — SIGNIFICANT CHANGE UP (ref 150–400)
POTASSIUM SERPL-MCNC: 4.6 MMOL/L — SIGNIFICANT CHANGE UP (ref 3.5–5.3)
POTASSIUM SERPL-SCNC: 4.6 MMOL/L — SIGNIFICANT CHANGE UP (ref 3.5–5.3)
PROT SERPL-MCNC: 6.5 G/DL — SIGNIFICANT CHANGE UP (ref 6–8.3)
PROT UR-MCNC: 15
RBC # BLD: 4.12 M/UL — SIGNIFICANT CHANGE UP (ref 3.8–5.2)
RBC # FLD: 12.7 % — SIGNIFICANT CHANGE UP (ref 10.3–14.5)
SARS-COV-2 RNA SPEC QL NAA+PROBE: SIGNIFICANT CHANGE UP
SODIUM SERPL-SCNC: 140 MMOL/L — SIGNIFICANT CHANGE UP (ref 135–145)
SP GR SPEC: 1.01 — SIGNIFICANT CHANGE UP (ref 1.01–1.02)
TROPONIN I SERPL-MCNC: 0.16 NG/ML — HIGH (ref 0–0.04)
UROBILINOGEN FLD QL: NEGATIVE — SIGNIFICANT CHANGE UP
WBC # BLD: 4.17 K/UL — SIGNIFICANT CHANGE UP (ref 3.8–10.5)
WBC # FLD AUTO: 4.17 K/UL — SIGNIFICANT CHANGE UP (ref 3.8–10.5)

## 2021-10-02 PROCEDURE — 99285 EMERGENCY DEPT VISIT HI MDM: CPT

## 2021-10-02 PROCEDURE — 71045 X-RAY EXAM CHEST 1 VIEW: CPT | Mod: 26

## 2021-10-02 RX ORDER — ASPIRIN/CALCIUM CARB/MAGNESIUM 324 MG
325 TABLET ORAL ONCE
Refills: 0 | Status: COMPLETED | OUTPATIENT
Start: 2021-10-02 | End: 2021-10-02

## 2021-10-02 RX ORDER — SODIUM CHLORIDE 9 MG/ML
1000 INJECTION INTRAMUSCULAR; INTRAVENOUS; SUBCUTANEOUS ONCE
Refills: 0 | Status: COMPLETED | OUTPATIENT
Start: 2021-10-02 | End: 2021-10-02

## 2021-10-02 RX ORDER — ONDANSETRON 8 MG/1
4 TABLET, FILM COATED ORAL ONCE
Refills: 0 | Status: COMPLETED | OUTPATIENT
Start: 2021-10-02 | End: 2021-10-02

## 2021-10-02 RX ADMIN — ONDANSETRON 4 MILLIGRAM(S): 8 TABLET, FILM COATED ORAL at 23:03

## 2021-10-02 RX ADMIN — SODIUM CHLORIDE 1000 MILLILITER(S): 9 INJECTION INTRAMUSCULAR; INTRAVENOUS; SUBCUTANEOUS at 23:03

## 2021-10-02 NOTE — ED PROVIDER NOTE - OBJECTIVE STATEMENT
82 y/o woman, h/o HTN, high cholesterol, asthma, c/o generalized weakness and nausea/vomiting since yesterday.  Appetite decreased.  Denies fever/chills/chest pain/SOB/cough/dysuria/focal weakness.

## 2021-10-02 NOTE — ED PROVIDER NOTE - NSICDXPASTMEDICALHX_GEN_ALL_CORE_FT
PAST MEDICAL HISTORY:  Allergy     Asthma     Depression     HTN (hypertension)     Hypercholesterolemia

## 2021-10-02 NOTE — ED PROVIDER NOTE - CARE PLAN
1 Principal Discharge DX:	NSTEMI (non-ST elevation myocardial infarction)  Secondary Diagnosis:	Nausea and vomiting  Secondary Diagnosis:	General weakness

## 2021-10-02 NOTE — ED PROVIDER NOTE - NS ED MD DISPO DIVISION
[facility] Complex Repair And M Plasty Text: The defect edges were debeveled with a #15 scalpel blade.  The primary defect was closed partially with a complex linear closure.  Given the location of the remaining defect, shape of the defect and the proximity to free margins an M plasty was deemed most appropriate for complete closure of the defect.  Using a sterile surgical marker, an appropriate advancement flap was drawn incorporating the defect and placing the expected incisions within the relaxed skin tension lines where possible.    The area thus outlined was incised deep to adipose tissue with a #15 scalpel blade.  The skin margins were undermined to an appropriate distance in all directions utilizing iris scissors.

## 2021-10-02 NOTE — ED PROVIDER NOTE - CLINICAL SUMMARY MEDICAL DECISION MAKING FREE TEXT BOX
80 y/o woman, h/o HTN, high cholesterol, asthma, c/o generalized weakness and nausea/vomiting since yesterday--labs, EKG, COVID swab, reassess.

## 2021-10-03 DIAGNOSIS — E78.5 HYPERLIPIDEMIA, UNSPECIFIED: ICD-10-CM

## 2021-10-03 DIAGNOSIS — Z29.9 ENCOUNTER FOR PROPHYLACTIC MEASURES, UNSPECIFIED: ICD-10-CM

## 2021-10-03 DIAGNOSIS — I10 ESSENTIAL (PRIMARY) HYPERTENSION: ICD-10-CM

## 2021-10-03 DIAGNOSIS — J45.909 UNSPECIFIED ASTHMA, UNCOMPLICATED: ICD-10-CM

## 2021-10-03 DIAGNOSIS — I21.4 NON-ST ELEVATION (NSTEMI) MYOCARDIAL INFARCTION: ICD-10-CM

## 2021-10-03 DIAGNOSIS — N39.0 URINARY TRACT INFECTION, SITE NOT SPECIFIED: ICD-10-CM

## 2021-10-03 DIAGNOSIS — R77.8 OTHER SPECIFIED ABNORMALITIES OF PLASMA PROTEINS: ICD-10-CM

## 2021-10-03 DIAGNOSIS — K21.9 GASTRO-ESOPHAGEAL REFLUX DISEASE WITHOUT ESOPHAGITIS: ICD-10-CM

## 2021-10-03 LAB
A1C WITH ESTIMATED AVERAGE GLUCOSE RESULT: 5.9 % — HIGH (ref 4–5.6)
ALBUMIN SERPL ELPH-MCNC: 2.8 G/DL — LOW (ref 3.5–5)
ALP SERPL-CCNC: 53 U/L — SIGNIFICANT CHANGE UP (ref 40–120)
ALT FLD-CCNC: 23 U/L DA — SIGNIFICANT CHANGE UP (ref 10–60)
ANION GAP SERPL CALC-SCNC: 4 MMOL/L — LOW (ref 5–17)
AST SERPL-CCNC: 38 U/L — SIGNIFICANT CHANGE UP (ref 10–40)
BASOPHILS # BLD AUTO: 0.03 K/UL — SIGNIFICANT CHANGE UP (ref 0–0.2)
BASOPHILS NFR BLD AUTO: 0.7 % — SIGNIFICANT CHANGE UP (ref 0–2)
BILIRUB SERPL-MCNC: 0.4 MG/DL — SIGNIFICANT CHANGE UP (ref 0.2–1.2)
BUN SERPL-MCNC: 14 MG/DL — SIGNIFICANT CHANGE UP (ref 7–18)
CALCIUM SERPL-MCNC: 8.3 MG/DL — LOW (ref 8.4–10.5)
CHLORIDE SERPL-SCNC: 109 MMOL/L — HIGH (ref 96–108)
CHOLEST SERPL-MCNC: 149 MG/DL — SIGNIFICANT CHANGE UP
CO2 SERPL-SCNC: 27 MMOL/L — SIGNIFICANT CHANGE UP (ref 22–31)
CREAT SERPL-MCNC: 0.83 MG/DL — SIGNIFICANT CHANGE UP (ref 0.5–1.3)
CULTURE RESULTS: SIGNIFICANT CHANGE UP
EOSINOPHIL # BLD AUTO: 0.06 K/UL — SIGNIFICANT CHANGE UP (ref 0–0.5)
EOSINOPHIL NFR BLD AUTO: 1.3 % — SIGNIFICANT CHANGE UP (ref 0–6)
ESTIMATED AVERAGE GLUCOSE: 123 MG/DL — HIGH (ref 68–114)
GLUCOSE SERPL-MCNC: 86 MG/DL — SIGNIFICANT CHANGE UP (ref 70–99)
HCT VFR BLD CALC: 35.9 % — SIGNIFICANT CHANGE UP (ref 34.5–45)
HDLC SERPL-MCNC: 59 MG/DL — SIGNIFICANT CHANGE UP
HGB BLD-MCNC: 11.7 G/DL — SIGNIFICANT CHANGE UP (ref 11.5–15.5)
IMM GRANULOCYTES NFR BLD AUTO: 0.2 % — SIGNIFICANT CHANGE UP (ref 0–1.5)
LIPID PNL WITH DIRECT LDL SERPL: 76 MG/DL — SIGNIFICANT CHANGE UP
LYMPHOCYTES # BLD AUTO: 1.23 K/UL — SIGNIFICANT CHANGE UP (ref 1–3.3)
LYMPHOCYTES # BLD AUTO: 27.3 % — SIGNIFICANT CHANGE UP (ref 13–44)
MAGNESIUM SERPL-MCNC: 2 MG/DL — SIGNIFICANT CHANGE UP (ref 1.6–2.6)
MCHC RBC-ENTMCNC: 28.9 PG — SIGNIFICANT CHANGE UP (ref 27–34)
MCHC RBC-ENTMCNC: 32.6 GM/DL — SIGNIFICANT CHANGE UP (ref 32–36)
MCV RBC AUTO: 88.6 FL — SIGNIFICANT CHANGE UP (ref 80–100)
MONOCYTES # BLD AUTO: 0.62 K/UL — SIGNIFICANT CHANGE UP (ref 0–0.9)
MONOCYTES NFR BLD AUTO: 13.8 % — SIGNIFICANT CHANGE UP (ref 2–14)
NEUTROPHILS # BLD AUTO: 2.55 K/UL — SIGNIFICANT CHANGE UP (ref 1.8–7.4)
NEUTROPHILS NFR BLD AUTO: 56.7 % — SIGNIFICANT CHANGE UP (ref 43–77)
NON HDL CHOLESTEROL: 90 MG/DL — SIGNIFICANT CHANGE UP
NRBC # BLD: 0 /100 WBCS — SIGNIFICANT CHANGE UP (ref 0–0)
PHOSPHATE SERPL-MCNC: 3.1 MG/DL — SIGNIFICANT CHANGE UP (ref 2.5–4.5)
PLATELET # BLD AUTO: 142 K/UL — LOW (ref 150–400)
POTASSIUM SERPL-MCNC: 5.2 MMOL/L — SIGNIFICANT CHANGE UP (ref 3.5–5.3)
POTASSIUM SERPL-SCNC: 5.2 MMOL/L — SIGNIFICANT CHANGE UP (ref 3.5–5.3)
PROT SERPL-MCNC: 6.5 G/DL — SIGNIFICANT CHANGE UP (ref 6–8.3)
RBC # BLD: 4.05 M/UL — SIGNIFICANT CHANGE UP (ref 3.8–5.2)
RBC # FLD: 12.9 % — SIGNIFICANT CHANGE UP (ref 10.3–14.5)
SODIUM SERPL-SCNC: 140 MMOL/L — SIGNIFICANT CHANGE UP (ref 135–145)
SPECIMEN SOURCE: SIGNIFICANT CHANGE UP
TRIGL SERPL-MCNC: 68 MG/DL — SIGNIFICANT CHANGE UP
TROPONIN I SERPL-MCNC: 0.13 NG/ML — HIGH (ref 0–0.04)
TSH SERPL-MCNC: 1.56 UU/ML — SIGNIFICANT CHANGE UP (ref 0.34–4.82)
WBC # BLD: 4.5 K/UL — SIGNIFICANT CHANGE UP (ref 3.8–10.5)
WBC # FLD AUTO: 4.5 K/UL — SIGNIFICANT CHANGE UP (ref 3.8–10.5)

## 2021-10-03 PROCEDURE — 99222 1ST HOSP IP/OBS MODERATE 55: CPT

## 2021-10-03 PROCEDURE — 74176 CT ABD & PELVIS W/O CONTRAST: CPT | Mod: 26

## 2021-10-03 PROCEDURE — 93970 EXTREMITY STUDY: CPT | Mod: 26

## 2021-10-03 RX ORDER — ROSUVASTATIN CALCIUM 5 MG/1
1 TABLET ORAL
Qty: 0 | Refills: 0 | DISCHARGE

## 2021-10-03 RX ORDER — HYDRALAZINE HCL 50 MG
2.5 TABLET ORAL ONCE
Refills: 0 | Status: COMPLETED | OUTPATIENT
Start: 2021-10-03 | End: 2021-10-03

## 2021-10-03 RX ORDER — CETIRIZINE HYDROCHLORIDE 10 MG/1
1 TABLET ORAL
Qty: 0 | Refills: 0 | DISCHARGE

## 2021-10-03 RX ORDER — ALBUTEROL 90 UG/1
2 AEROSOL, METERED ORAL EVERY 6 HOURS
Refills: 0 | Status: DISCONTINUED | OUTPATIENT
Start: 2021-10-03 | End: 2021-10-05

## 2021-10-03 RX ORDER — IOHEXOL 300 MG/ML
30 INJECTION, SOLUTION INTRAVENOUS ONCE
Refills: 0 | Status: COMPLETED | OUTPATIENT
Start: 2021-10-03 | End: 2021-10-03

## 2021-10-03 RX ORDER — CYCLOSPORINE 0.5 MG/ML
1 EMULSION OPHTHALMIC
Qty: 0 | Refills: 0 | DISCHARGE

## 2021-10-03 RX ORDER — OMEPRAZOLE 10 MG/1
1 CAPSULE, DELAYED RELEASE ORAL
Qty: 0 | Refills: 0 | DISCHARGE

## 2021-10-03 RX ORDER — METOPROLOL TARTRATE 50 MG
12.5 TABLET ORAL EVERY 12 HOURS
Refills: 0 | Status: DISCONTINUED | OUTPATIENT
Start: 2021-10-03 | End: 2021-10-03

## 2021-10-03 RX ORDER — ATORVASTATIN CALCIUM 80 MG/1
40 TABLET, FILM COATED ORAL AT BEDTIME
Refills: 0 | Status: DISCONTINUED | OUTPATIENT
Start: 2021-10-03 | End: 2021-10-05

## 2021-10-03 RX ORDER — DULOXETINE HYDROCHLORIDE 30 MG/1
1 CAPSULE, DELAYED RELEASE ORAL
Qty: 0 | Refills: 0 | DISCHARGE

## 2021-10-03 RX ORDER — OLOPATADINE HYDROCHLORIDE 1 MG/ML
1 SOLUTION/ DROPS OPHTHALMIC
Qty: 0 | Refills: 0 | DISCHARGE

## 2021-10-03 RX ORDER — ALBUTEROL 90 UG/1
2 AEROSOL, METERED ORAL
Qty: 0 | Refills: 0 | DISCHARGE

## 2021-10-03 RX ORDER — MOMETASONE FUROATE AND FORMOTEROL FUMARATE DIHYDRATE 200; 5 UG/1; UG/1
2 AEROSOL RESPIRATORY (INHALATION)
Qty: 0 | Refills: 0 | DISCHARGE

## 2021-10-03 RX ORDER — ASCORBIC ACID 60 MG
1 TABLET,CHEWABLE ORAL
Qty: 0 | Refills: 0 | DISCHARGE

## 2021-10-03 RX ORDER — LOSARTAN POTASSIUM 100 MG/1
25 TABLET, FILM COATED ORAL ONCE
Refills: 0 | Status: COMPLETED | OUTPATIENT
Start: 2021-10-03 | End: 2021-10-03

## 2021-10-03 RX ORDER — SENNA PLUS 8.6 MG/1
1 TABLET ORAL
Qty: 0 | Refills: 0 | DISCHARGE

## 2021-10-03 RX ORDER — ENOXAPARIN SODIUM 100 MG/ML
40 INJECTION SUBCUTANEOUS DAILY
Refills: 0 | Status: DISCONTINUED | OUTPATIENT
Start: 2021-10-03 | End: 2021-10-05

## 2021-10-03 RX ORDER — LOSARTAN POTASSIUM 100 MG/1
50 TABLET, FILM COATED ORAL DAILY
Refills: 0 | Status: DISCONTINUED | OUTPATIENT
Start: 2021-10-03 | End: 2021-10-05

## 2021-10-03 RX ORDER — CEFTRIAXONE 500 MG/1
1000 INJECTION, POWDER, FOR SOLUTION INTRAMUSCULAR; INTRAVENOUS EVERY 24 HOURS
Refills: 0 | Status: COMPLETED | OUTPATIENT
Start: 2021-10-03 | End: 2021-10-05

## 2021-10-03 RX ORDER — METOPROLOL TARTRATE 50 MG
12.5 TABLET ORAL EVERY 12 HOURS
Refills: 0 | Status: DISCONTINUED | OUTPATIENT
Start: 2021-10-03 | End: 2021-10-05

## 2021-10-03 RX ORDER — BUDESONIDE AND FORMOTEROL FUMARATE DIHYDRATE 160; 4.5 UG/1; UG/1
2 AEROSOL RESPIRATORY (INHALATION)
Refills: 0 | Status: DISCONTINUED | OUTPATIENT
Start: 2021-10-03 | End: 2021-10-05

## 2021-10-03 RX ADMIN — Medication 12.5 MILLIGRAM(S): at 17:51

## 2021-10-03 RX ADMIN — ENOXAPARIN SODIUM 40 MILLIGRAM(S): 100 INJECTION SUBCUTANEOUS at 11:59

## 2021-10-03 RX ADMIN — LOSARTAN POTASSIUM 25 MILLIGRAM(S): 100 TABLET, FILM COATED ORAL at 21:22

## 2021-10-03 RX ADMIN — Medication 325 MILLIGRAM(S): at 02:04

## 2021-10-03 RX ADMIN — LOSARTAN POTASSIUM 50 MILLIGRAM(S): 100 TABLET, FILM COATED ORAL at 05:34

## 2021-10-03 RX ADMIN — BUDESONIDE AND FORMOTEROL FUMARATE DIHYDRATE 2 PUFF(S): 160; 4.5 AEROSOL RESPIRATORY (INHALATION) at 21:23

## 2021-10-03 RX ADMIN — IOHEXOL 30 MILLILITER(S): 300 INJECTION, SOLUTION INTRAVENOUS at 13:41

## 2021-10-03 RX ADMIN — ATORVASTATIN CALCIUM 40 MILLIGRAM(S): 80 TABLET, FILM COATED ORAL at 21:23

## 2021-10-03 RX ADMIN — Medication 12.5 MILLIGRAM(S): at 05:34

## 2021-10-03 RX ADMIN — BUDESONIDE AND FORMOTEROL FUMARATE DIHYDRATE 2 PUFF(S): 160; 4.5 AEROSOL RESPIRATORY (INHALATION) at 11:49

## 2021-10-03 RX ADMIN — SODIUM CHLORIDE 1000 MILLILITER(S): 9 INJECTION INTRAMUSCULAR; INTRAVENOUS; SUBCUTANEOUS at 00:03

## 2021-10-03 RX ADMIN — CEFTRIAXONE 100 MILLIGRAM(S): 500 INJECTION, POWDER, FOR SOLUTION INTRAMUSCULAR; INTRAVENOUS at 02:06

## 2021-10-03 RX ADMIN — Medication 2.5 MILLIGRAM(S): at 21:20

## 2021-10-03 NOTE — CONSULT NOTE ADULT - SUBJECTIVE AND OBJECTIVE BOX
CHIEF COMPLAINT:Patient is a 81y old  Female who presents with a chief complaint of Generalized weakness.      HPI:  81F from home, lives with daughter, w/ PMH HTN, HLD, Asthma, p/w generalized weakness and nausea/vomiting since 10/2. Pt with decreased po intake and decreased appetite. Pt with 1 episode of NBNB vomiting and nausea x1 day. Pt with decreased mobility due to generalized weakness which promted ED visit. Pt denies fever, chills, chest pain, palpitations, sob, cough, urinary symptoms. (03 Oct 2021 04:04)      PAST MEDICAL & SURGICAL HISTORY:  Asthma    Hypercholesterolemia    HTN (hypertension)    Depression    Allergy    S/P hernia surgery        MEDICATIONS  (STANDING):  atorvastatin 40 milliGRAM(s) Oral at bedtime  budesonide 160 MICROgram(s)/formoterol 4.5 MICROgram(s) Inhaler 2 Puff(s) Inhalation two times a day  cefTRIAXone   IVPB 1000 milliGRAM(s) IV Intermittent every 24 hours  enoxaparin Injectable 40 milliGRAM(s) SubCutaneous daily  losartan 50 milliGRAM(s) Oral daily  metoprolol tartrate 12.5 milliGRAM(s) Oral every 12 hours    MEDICATIONS  (PRN):  ALBUTerol    90 MICROgram(s) HFA Inhaler 2 Puff(s) Inhalation every 6 hours PRN Shortness of Breath and/or Wheezing      FAMILY HISTORY:      SOCIAL HISTORY:    [ ] Non-smoker  [ ] Smoker  [ ] Alcohol    Allergies    No Known Allergies    Intolerances    	    REVIEW OF SYSTEMS:  CONSTITUTIONAL: No fever, weight loss, or fatigue  EYES: No eye pain, visual disturbances, or discharge  ENT:  No difficulty hearing, tinnitus, vertigo; No sinus or throat pain  NECK: No pain or stiffness  RESPIRATORY: No cough, wheezing, chills or hemoptysis; No Shortness of Breath  CARDIOVASCULAR: No chest pain, palpitations, passing out, dizziness, or leg swelling  GASTROINTESTINAL: No abdominal or epigastric pain. No nausea, vomiting, or hematemesis; No diarrhea or constipation. No melena or hematochezia.  GENITOURINARY: No dysuria, frequency, hematuria, or incontinence  NEUROLOGICAL: No headaches, memory loss, loss of strength, numbness, or tremors  SKIN: No itching, burning, rashes, or lesions   LYMPH Nodes: No enlarged glands  ENDOCRINE: No heat or cold intolerance; No hair loss  MUSCULOSKELETAL: No joint pain or swelling; No muscle, back, or extremity pain  PSYCHIATRIC: No depression, anxiety, mood swings, or difficulty sleeping  HEME/LYMPH: No easy bruising, or bleeding gums  ALLERGY AND IMMUNOLOGIC: No hives or eczema	    [ ] All others negative	  [ ] Unable to obtain    PHYSICAL EXAM:  T(C): 36.5 (10-03-21 @ 07:37), Max: 36.9 (10-02-21 @ 19:10)  HR: 55 (10-03-21 @ 07:37) (55 - 70)  BP: 164/73 (10-03-21 @ 07:37) (151/69 - 175/60)  RR: 18 (10-03-21 @ 07:37) (18 - 20)  SpO2: 97% (10-03-21 @ 07:37) (97% - 99%)  Wt(kg): --  I&O's Summary      Appearance: Normal	  HEENT:   Normal oral mucosa, PERRL, EOMI	  Lymphatic: No lymphadenopathy  Cardiovascular: Normal S1 S2, No JVD, No murmurs, No edema  Respiratory: Lungs clear to auscultation	  Psychiatry: A & O x 3, Mood & affect appropriate  Gastrointestinal:  Soft, Non-tender, + BS	  Skin: No rashes, No ecchymoses, No cyanosis	  Neurologic: Non-focal  Extremities: Normal range of motion, No clubbing, cyanosis or edema  Vascular: Peripheral pulses palpable 2+ bilaterally    TELEMETRY: 	    ECG:  	  RADIOLOGY:  OTHER: 	  	  LABS:	 	    CARDIAC MARKERS:  CARDIAC MARKERS ( 03 Oct 2021 06:05 )  0.132 ng/mL / x     / x     / x     / x      CARDIAC MARKERS ( 02 Oct 2021 22:16 )  0.160 ng/mL / x     / x     / x     / x                                  11.7   4.50  )-----------( 142      ( 03 Oct 2021 06:05 )             35.9     10-03    140  |  109<H>  |  14  ----------------------------<  86  5.2   |  27  |  0.83    Ca    8.3<L>      03 Oct 2021 06:05  Phos  3.1     10-03  Mg     2.0     10-03    TPro  6.5  /  Alb  2.8<L>  /  TBili  0.4  /  DBili  x   /  AST  38  /  ALT  23  /  AlkPhos  53  10-03      Lipid Profile: Cholesterol 149  HDL 59  TG 68      TSH: Thyroid Stimulating Hormone, Serum: 1.56 uU/mL (10-03 @ 06:05)      PREVIOUS DIAGNOSTIC TESTING:    [ ] Echocardiogram:  [ ]  Catheterization:  [ ] Stress Test:

## 2021-10-03 NOTE — CONSULT NOTE ADULT - SUBJECTIVE AND OBJECTIVE BOX
Patient is a 81y old  Female who presents with a chief complaint of Generalized weakness (03 Oct 2021 04:04)      HPI: Nausea Vomiting and generalized weakness without headache; unable to walk    PAST MEDICAL & SURGICAL HISTORY:  Asthma    Hypercholesterolemia    HTN (hypertension)    Depression    Allergy    S/P hernia surgery        FAMILY HISTORY:        Social Hx:  Nonsmoker, no drug or alcohol use    MEDICATIONS  (STANDING):  atorvastatin 40 milliGRAM(s) Oral at bedtime  budesonide 160 MICROgram(s)/formoterol 4.5 MICROgram(s) Inhaler 2 Puff(s) Inhalation two times a day  cefTRIAXone   IVPB 1000 milliGRAM(s) IV Intermittent every 24 hours  enoxaparin Injectable 40 milliGRAM(s) SubCutaneous daily  losartan 50 milliGRAM(s) Oral daily  metoprolol tartrate 12.5 milliGRAM(s) Oral every 12 hours       Allergies    No Known Allergies    Intolerances        ROS: Pertinent positives in HPI, all other ROS were reviewed and are negative.      Vital Signs Last 24 Hrs  T(C): 36.5 (03 Oct 2021 07:37), Max: 36.9 (02 Oct 2021 19:10)  T(F): 97.7 (03 Oct 2021 07:37), Max: 98.4 (02 Oct 2021 19:10)  HR: 55 (03 Oct 2021 07:37) (55 - 70)  BP: 164/73 (03 Oct 2021 07:37) (151/69 - 175/60)  BP(mean): --  RR: 18 (03 Oct 2021 07:37) (18 - 20)  SpO2: 97% (03 Oct 2021 07:37) (97% - 99%)        Constitutional: awake and alert.  HEENT: PERRLA, EOMI,   Neck: Supple.  Respiratory: Breath sounds are clear bilaterally  Cardiovascular: S1 and S2, regular / irregular rhythm  Gastrointestinal: soft, nontender  Extremities:  no edema  Vascular: Caritid Bruit - no  Musculoskeletal: no joint swelling/tenderness, no abnormal movements  Skin: No rashes    Neurological exam:  HF: A x O x 3. Appropriately interactive, normal affect. Speech fluent, No Aphasia or paraphasic errors. Naming /repetition intact   CN: ANTHONY, EOMI, VFF, facial sensation normal, no NLFD, tongue midline, Palate moves equally, SCM equal bilaterally  Motor: No pronator drift, Strength 5/5 in all 4 ext, normal bulk and tone, no tremor, rigidity or bradykinesia.    Sens: Intact to light touch / PP/ VS/ JS    Reflexes: Symmetric and normal . BJ 2+, BR 2+, KJ 2+, AJ 2+, downgoing toes b/l  Coord:  No FNFA, dysmetria, SUSAN intact   Gait/Balance: Normal/Cannot test    NIHSS:          Labs:                        11.7   4.50  )-----------( 142      ( 03 Oct 2021 06:05 )             35.9     10-03    140  |  109<H>  |  14  ----------------------------<  86  5.2   |  27  |  0.83    Ca    8.3<L>      03 Oct 2021 06:05  Phos  3.1     10-03  Mg     2.0     10-03    TPro  6.5  /  Alb  2.8<L>  /  TBili  0.4  /  DBili  x   /  AST  38  /  ALT  23  /  AlkPhos  53  10-03      10-03 Chol 149 LDL -- HDL 59 Trig 68      Radiology report:  < from: CT Head No Cont (02.13.20 @ 00:07) >  EXAM:  CT BRAIN                            PROCEDURE DATE:  02/13/2020          INTERPRETATION:  CLINICAL INFORMATION: Dizziness.    COMPARISON: CT head of 10/7/2018.    PROCEDURE:   Noncontrast CT of the Head was performed from the skull base to the vertex. Coronal and Sagittal reformats were obtained.    FINDINGS:    There is no acute intracranial hemorrhage, mass effect, midline shift, extra-axial collection, hydrocephalus, or evidence of acute vascular territorial infarction. Stable focus of encephalomalacia along the right posterior temporal lobe, may represent sequelae of a chronic infarct. Cerebral volume loss results in prominence of the ventricles and sulci. Stable calcified 1.2 cm extra-axial lesion along the right frontal convexity, may represent a meningioma.    The visualized paranasal sinuses and mastoid air cells are clear. Status post bilateral cataract surgery. Visualized osseous structures are intact.    IMPRESSION:     No acute intracranial hemorrhage, mass effect, orevidence of acute vascular territorial infarction.    If clinical symptoms persist or worsen, more sensitive evaluation with brain MRI may be obtained, if no contraindications exist.                SILVINO FIORE M.D., ATTENDING RADIOLOGIST  This document has been electronically signed. Feb 13 2020 12:29AM    < end of copied text >    A/P: Patient is a 81y old  Female who presents with a chief complaint of Generalized weakness (03 Oct 2021 04:04)      HPI: Nausea Vomiting and generalized weakness without headache; unable to walk    PAST MEDICAL & SURGICAL HISTORY:  Asthma    Hypercholesterolemia    HTN (hypertension)    Depression    Allergy    S/P hernia surgery        FAMILY HISTORY:        Social Hx:  Nonsmoker, no drug or alcohol use    MEDICATIONS  (STANDING):  atorvastatin 40 milliGRAM(s) Oral at bedtime  budesonide 160 MICROgram(s)/formoterol 4.5 MICROgram(s) Inhaler 2 Puff(s) Inhalation two times a day  cefTRIAXone   IVPB 1000 milliGRAM(s) IV Intermittent every 24 hours  enoxaparin Injectable 40 milliGRAM(s) SubCutaneous daily  losartan 50 milliGRAM(s) Oral daily  metoprolol tartrate 12.5 milliGRAM(s) Oral every 12 hours       Allergies    No Known Allergies    Intolerances        ROS: Pertinent positives in HPI, all other ROS were reviewed and are negative.      Vital Signs Last 24 Hrs  T(C): 36.5 (03 Oct 2021 07:37), Max: 36.9 (02 Oct 2021 19:10)  T(F): 97.7 (03 Oct 2021 07:37), Max: 98.4 (02 Oct 2021 19:10)  HR: 55 (03 Oct 2021 07:37) (55 - 70)  BP: 164/73 (03 Oct 2021 07:37) (151/69 - 175/60)  BP(mean): --  RR: 18 (03 Oct 2021 07:37) (18 - 20)  SpO2: 97% (03 Oct 2021 07:37) (97% - 99%)        Constitutional: awake and alert.  HEENT: PERRLA, EOMI,   Neck: Supple.  Respiratory: Breath sounds are clear bilaterally  Cardiovascular: S1 and S2, regularrhythm  Gastrointestinal: soft, nontender  Extremities:  no edema  Vascular: Carotid Bruit - no  Musculoskeletal: no joint swelling/tenderness, no abnormal movements  Skin: No rashes    Neurological exam:  HF: A x O x 3. Appropriately interactive, normal affect. Speech fluent, No Aphasia or paraphasic errors. Naming /repetition intact   CN: ANTHONY, EOMI, VFF, facial sensation normal, no NLFD, tongue midline, Palate moves equally, SCM equal bilaterally  Motor: No pronator drift, Strength 5/5 in all 4 ext, normal bulk and tone, no tremor, rigidity or bradykinesia.    Sens: Intact to light touch / PP/ VS/ JS    Reflexes: Symmetric and normal . BJ 2+, BR 2+, KJ 2+, AJ 2+, downgoing toes b/l  Coord:  No FNFA, dysmetria, +Heel to-shin SUSAN irregular slow   Gait/Balance: Unsteady; Romberg positive    NIHSS: 4  MRS 2  1A: Level of consciousness       0= Alert; keenly responsive  1B: Ask month and age       0= Both questions right  1C: "Blink eyes" and "Squeeze Hands"       0= Performs both  2: Horizontal EOMs       0= Normal  3: Visual fields       0= No visual loss  4: Facial palsy (use grimace if obtunded)       0= Normal symmetry  5A: Left arm motor drift (count out loud and use fingers to show count)       0= No drift x 10 seconds  5B: Right arm motor drift       0= No drift x 10 seconds  6A: Left leg motor drift       +1= Drift but doesn't hit bed  6B: Right leg motor drift       +1= Drift but doesn't hit bed  7: Limb ataxia (FNF/heel-shin)a       +2= Ataxia in 2 limbs  8: Sensation       0= Normal, no sensory loss  9: Language/aphasia- describe the scene (on ruslan); name the items; read the sentences (on ruslan)       0= Normal, no aphasia  10: Dysarthria- read the words       0= Normal  11: Extinction/inattention       0= No abnormality          Labs:                        11.7   4.50  )-----------( 142      ( 03 Oct 2021 06:05 )             35.9     10-03    140  |  109<H>  |  14  ----------------------------<  86  5.2   |  27  |  0.83    Ca    8.3<L>      03 Oct 2021 06:05  Phos  3.1     10-03  Mg     2.0     10-03    TPro  6.5  /  Alb  2.8<L>  /  TBili  0.4  /  DBili  x   /  AST  38  /  ALT  23  /  AlkPhos  53  10-03      10-03 Chol 149 LDL -- HDL 59 Trig 68      Radiology report:  < from: CT Head No Cont (02.13.20 @ 00:07) >  EXAM:  CT BRAIN                            PROCEDURE DATE:  02/13/2020          INTERPRETATION:  CLINICAL INFORMATION: Dizziness.    COMPARISON: CT head of 10/7/2018.    PROCEDURE:   Noncontrast CT of the Head was performed from the skull base to the vertex. Coronal and Sagittal reformats were obtained.    FINDINGS:    There is no acute intracranial hemorrhage, mass effect, midline shift, extra-axial collection, hydrocephalus, or evidence of acute vascular territorial infarction. Stable focus of encephalomalacia along the right posterior temporal lobe, may represent sequelae of a chronic infarct. Cerebral volume loss results in prominence of the ventricles and sulci. Stable calcified 1.2 cm extra-axial lesion along the right frontal convexity, may represent a meningioma.    The visualized paranasal sinuses and mastoid air cells are clear. Status post bilateral cataract surgery. Visualized osseous structures are intact.    IMPRESSION:     No acute intracranial hemorrhage, mass effect, orevidence of acute vascular territorial infarction.    If clinical symptoms persist or worsen, more sensitive evaluation with brain MRI may be obtained, if no contraindications exist.                SILVINO FIORE M.D., ATTENDING RADIOLOGIST  This document has been electronically signed. Feb 13 2020 12:29AM    < end of copied text >    A/P

## 2021-10-03 NOTE — PATIENT PROFILE ADULT - CENTRAL VENOUS CATHETER/PICC LINE
no Erythromycin Pregnancy And Lactation Text: This medication is Pregnancy Category B and is considered safe during pregnancy. It is also excreted in breast milk.

## 2021-10-03 NOTE — CONSULT NOTE ADULT - ASSESSMENT
81F from home, lives with daughter, w/ PMH HTN, HLD, Asthma, p/w generalized weakness and nausea/vomiting since 10/2. Pt with decreased po intake and decreased appetite and chest pain with borderline troponins.  1.Tele monitoring.  2.Echocardiogram.  3.Stress test-r/o ischemia.  4.Dec appetite, N/V-CT abd and pelvis.  5.Neurology eval noted.  6.HTN-cont bp medication.  7.GI and DVT prophylaxis.

## 2021-10-03 NOTE — H&P ADULT - PROBLEM SELECTOR PLAN 2
elevated troponin, trend  no active chest pain  ekg shows NSR  tele monitoring  f/u tte  start bb  on asa, statin

## 2021-10-03 NOTE — H&P ADULT - ASSESSMENT
81F from home, lives with daughter, w/ PMH HTN, HLD, Asthma, p/w generalized weakness and nausea/vomiting since 10/2 admitted for Generalized weakness due to UTI, incidentally found to have troponinemia.

## 2021-10-03 NOTE — H&P ADULT - NSHPPHYSICALEXAM_GEN_ALL_CORE
General - NAD, sitting up in bed, well groomed  Eyes - PERRLA, EOM intact  ENT - Nonicteric sclerae, PERRLA, EOMI. Oropharynx clear. Moist mucous membranes. Conjunctivae appear well perfused.   Neck - No noticeable or palpable swelling, redness or rash around throat or on face  Lymph Nodes - No lymphadenopathy  Cardiovascular - RRR no m/r/g, no JVD, no carotid bruits  Lungs - Clear to auscultation, no use of accessory muscles, no crackles or wheezes.  Skin - No rashes, skin warm and dry, no erythematous areas  Abdomen - Normal bowel sounds, abdomen soft and nontender  Rectal – Rectal exam not performed since no symptoms indicated blood loss.  Extremities - No edema, cyanosis or clubbing  Musculoskeletal - 5/5 strength, normal range of motion, no swollen or erythematous joints.  Neuro– Alert and oriented x 3, CN 2-12 grossly intact.

## 2021-10-03 NOTE — CONSULT NOTE ADULT - ASSESSMENT
At risk for posterior circulation stroke in view of age and risk factors    - No IV tpa given because time of onset unknown  - ASA/ PLavix  - Statin  - Dysphagia screen  - DVT prophylaxis  - MRI/A brain  - PT eval  - Speech/swallow eval    Total Critical Care Time spent:

## 2021-10-03 NOTE — H&P ADULT - HISTORY OF PRESENT ILLNESS
81F from home, lives with daughter, w/ PMH HTN, HLD, Asthma, p/w generalized weakness and nausea/vomiting since 10/2. Pt with decreased po intake and decreased appetite. Pt with 1 episode of NBNB vomiting and nausea x1 day. Pt with decreased mobility due to generalized weakness which promted ED visit. Pt denies fever, chills, chest pain, palpitations, sob, cough, urinary symptoms.

## 2021-10-03 NOTE — CHART NOTE - NSCHARTNOTEFT_GEN_A_CORE
Patient's blood pressure was elevated at 188/86. We ordered Losartan 25 mg to be given now (home meds) and Hydralazine 2.5 mg IV push STAT. Repeat BP was to be obtained and monitored.

## 2021-10-04 DIAGNOSIS — R53.1 WEAKNESS: ICD-10-CM

## 2021-10-04 LAB
ALBUMIN SERPL ELPH-MCNC: 3 G/DL — LOW (ref 3.5–5)
ALP SERPL-CCNC: 52 U/L — SIGNIFICANT CHANGE UP (ref 40–120)
ALT FLD-CCNC: 21 U/L DA — SIGNIFICANT CHANGE UP (ref 10–60)
ANION GAP SERPL CALC-SCNC: 6 MMOL/L — SIGNIFICANT CHANGE UP (ref 5–17)
AST SERPL-CCNC: 20 U/L — SIGNIFICANT CHANGE UP (ref 10–40)
BASOPHILS # BLD AUTO: 0.03 K/UL — SIGNIFICANT CHANGE UP (ref 0–0.2)
BASOPHILS NFR BLD AUTO: 0.7 % — SIGNIFICANT CHANGE UP (ref 0–2)
BILIRUB SERPL-MCNC: 0.5 MG/DL — SIGNIFICANT CHANGE UP (ref 0.2–1.2)
BUN SERPL-MCNC: 11 MG/DL — SIGNIFICANT CHANGE UP (ref 7–18)
CALCIUM SERPL-MCNC: 8.8 MG/DL — SIGNIFICANT CHANGE UP (ref 8.4–10.5)
CHLORIDE SERPL-SCNC: 104 MMOL/L — SIGNIFICANT CHANGE UP (ref 96–108)
CO2 SERPL-SCNC: 30 MMOL/L — SIGNIFICANT CHANGE UP (ref 22–31)
COVID-19 SPIKE DOMAIN AB INTERP: POSITIVE
COVID-19 SPIKE DOMAIN ANTIBODY RESULT: >250 U/ML — HIGH
CREAT SERPL-MCNC: 0.76 MG/DL — SIGNIFICANT CHANGE UP (ref 0.5–1.3)
EOSINOPHIL # BLD AUTO: 0.07 K/UL — SIGNIFICANT CHANGE UP (ref 0–0.5)
EOSINOPHIL NFR BLD AUTO: 1.6 % — SIGNIFICANT CHANGE UP (ref 0–6)
GLUCOSE SERPL-MCNC: 90 MG/DL — SIGNIFICANT CHANGE UP (ref 70–99)
HCT VFR BLD CALC: 36.4 % — SIGNIFICANT CHANGE UP (ref 34.5–45)
HGB BLD-MCNC: 12.1 G/DL — SIGNIFICANT CHANGE UP (ref 11.5–15.5)
IMM GRANULOCYTES NFR BLD AUTO: 0.5 % — SIGNIFICANT CHANGE UP (ref 0–1.5)
LYMPHOCYTES # BLD AUTO: 1.42 K/UL — SIGNIFICANT CHANGE UP (ref 1–3.3)
LYMPHOCYTES # BLD AUTO: 32 % — SIGNIFICANT CHANGE UP (ref 13–44)
MCHC RBC-ENTMCNC: 29.3 PG — SIGNIFICANT CHANGE UP (ref 27–34)
MCHC RBC-ENTMCNC: 33.2 GM/DL — SIGNIFICANT CHANGE UP (ref 32–36)
MCV RBC AUTO: 88.1 FL — SIGNIFICANT CHANGE UP (ref 80–100)
MONOCYTES # BLD AUTO: 0.48 K/UL — SIGNIFICANT CHANGE UP (ref 0–0.9)
MONOCYTES NFR BLD AUTO: 10.8 % — SIGNIFICANT CHANGE UP (ref 2–14)
NEUTROPHILS # BLD AUTO: 2.42 K/UL — SIGNIFICANT CHANGE UP (ref 1.8–7.4)
NEUTROPHILS NFR BLD AUTO: 54.4 % — SIGNIFICANT CHANGE UP (ref 43–77)
NRBC # BLD: 0 /100 WBCS — SIGNIFICANT CHANGE UP (ref 0–0)
PLATELET # BLD AUTO: 157 K/UL — SIGNIFICANT CHANGE UP (ref 150–400)
POTASSIUM SERPL-MCNC: 4.4 MMOL/L — SIGNIFICANT CHANGE UP (ref 3.5–5.3)
POTASSIUM SERPL-SCNC: 4.4 MMOL/L — SIGNIFICANT CHANGE UP (ref 3.5–5.3)
PROT SERPL-MCNC: 6.5 G/DL — SIGNIFICANT CHANGE UP (ref 6–8.3)
RBC # BLD: 4.13 M/UL — SIGNIFICANT CHANGE UP (ref 3.8–5.2)
RBC # FLD: 12.8 % — SIGNIFICANT CHANGE UP (ref 10.3–14.5)
SARS-COV-2 IGG+IGM SERPL QL IA: >250 U/ML — HIGH
SARS-COV-2 IGG+IGM SERPL QL IA: POSITIVE
SODIUM SERPL-SCNC: 140 MMOL/L — SIGNIFICANT CHANGE UP (ref 135–145)
WBC # BLD: 4.44 K/UL — SIGNIFICANT CHANGE UP (ref 3.8–10.5)
WBC # FLD AUTO: 4.44 K/UL — SIGNIFICANT CHANGE UP (ref 3.8–10.5)

## 2021-10-04 PROCEDURE — 70551 MRI BRAIN STEM W/O DYE: CPT | Mod: 26

## 2021-10-04 PROCEDURE — 70544 MR ANGIOGRAPHY HEAD W/O DYE: CPT | Mod: 26,59

## 2021-10-04 RX ORDER — CLOPIDOGREL BISULFATE 75 MG/1
75 TABLET, FILM COATED ORAL DAILY
Refills: 0 | Status: DISCONTINUED | OUTPATIENT
Start: 2021-10-04 | End: 2021-10-05

## 2021-10-04 RX ORDER — ASPIRIN/CALCIUM CARB/MAGNESIUM 324 MG
81 TABLET ORAL DAILY
Refills: 0 | Status: DISCONTINUED | OUTPATIENT
Start: 2021-10-04 | End: 2021-10-05

## 2021-10-04 RX ADMIN — ENOXAPARIN SODIUM 40 MILLIGRAM(S): 100 INJECTION SUBCUTANEOUS at 11:17

## 2021-10-04 RX ADMIN — BUDESONIDE AND FORMOTEROL FUMARATE DIHYDRATE 2 PUFF(S): 160; 4.5 AEROSOL RESPIRATORY (INHALATION) at 21:36

## 2021-10-04 RX ADMIN — BUDESONIDE AND FORMOTEROL FUMARATE DIHYDRATE 2 PUFF(S): 160; 4.5 AEROSOL RESPIRATORY (INHALATION) at 11:17

## 2021-10-04 RX ADMIN — LOSARTAN POTASSIUM 50 MILLIGRAM(S): 100 TABLET, FILM COATED ORAL at 05:14

## 2021-10-04 RX ADMIN — ATORVASTATIN CALCIUM 40 MILLIGRAM(S): 80 TABLET, FILM COATED ORAL at 21:36

## 2021-10-04 RX ADMIN — Medication 12.5 MILLIGRAM(S): at 05:15

## 2021-10-04 RX ADMIN — Medication 12.5 MILLIGRAM(S): at 17:25

## 2021-10-04 RX ADMIN — CEFTRIAXONE 100 MILLIGRAM(S): 500 INJECTION, POWDER, FOR SOLUTION INTRAMUSCULAR; INTRAVENOUS at 02:29

## 2021-10-04 RX ADMIN — Medication 81 MILLIGRAM(S): at 11:20

## 2021-10-04 RX ADMIN — CLOPIDOGREL BISULFATE 75 MILLIGRAM(S): 75 TABLET, FILM COATED ORAL at 11:20

## 2021-10-04 NOTE — PROGRESS NOTE ADULT - PROBLEM SELECTOR PLAN 2
p/w generalized weakness, n/v  UA positive  f/u Urine culture  C/w  Rocephin 1g qd elevated troponin, trend  no active chest pain  ekg shows NSR  tele monitoring  f/u tte  start bb  on asa, statin    Dr. Jimenez consulted  Stress test negative   TTE with mild to moderate MR, trace AR, mild concentric LVH, grossly normal LV systolic function

## 2021-10-04 NOTE — PROGRESS NOTE ADULT - SUBJECTIVE AND OBJECTIVE BOX
CHIEF COMPLAINT:Patient is a 81y old  Female who presents with a chief complaint of Generalized weakness.Pt appears comfortable.    	  REVIEW OF SYSTEMS:  CONSTITUTIONAL: No fever, weight loss, or fatigue  EYES: No eye pain, visual disturbances, or discharge  ENT:  No difficulty hearing, tinnitus, vertigo; No sinus or throat pain  NECK: No pain or stiffness  RESPIRATORY: No cough, wheezing, chills or hemoptysis; No Shortness of Breath  CARDIOVASCULAR: No chest pain, palpitations, passing out, dizziness, or leg swelling  GASTROINTESTINAL: No abdominal or epigastric pain. No nausea, vomiting, or hematemesis; No diarrhea or constipation. No melena or hematochezia.  GENITOURINARY: No dysuria, frequency, hematuria, or incontinence  NEUROLOGICAL: No headaches, memory loss, loss of strength, numbness, or tremors  SKIN: No itching, burning, rashes, or lesions   LYMPH Nodes: No enlarged glands  ENDOCRINE: No heat or cold intolerance; No hair loss  MUSCULOSKELETAL: No joint pain or swelling; No muscle, back, or extremity pain  PSYCHIATRIC: No depression, anxiety, mood swings, or difficulty sleeping  HEME/LYMPH: No easy bruising, or bleeding gums  ALLERGY AND IMMUNOLOGIC: No hives or eczema	      PHYSICAL EXAM:  T(C): 36.3 (10-04-21 @ 05:04), Max: 37.2 (10-03-21 @ 19:44)  HR: 56 (10-04-21 @ 05:04) (52 - 66)  BP: 152/76 (10-04-21 @ 05:04) (118/91 - 188/86)  RR: 18 (10-04-21 @ 05:04) (18 - 18)  SpO2: 99% (10-04-21 @ 05:04) (97% - 100%)      Appearance: Normal	  HEENT:   Normal oral mucosa, PERRL, EOMI	  Lymphatic: No lymphadenopathy  Cardiovascular: Normal S1 S2, No JVD, No murmurs, No edema  Respiratory: Lungs clear to auscultation	  Psychiatry: A & O x 3, Mood & affect appropriate  Gastrointestinal:  Soft, Non-tender, + BS	  Skin: No rashes, No ecchymoses, No cyanosis	  Neurologic: Non-focal  Extremities: Normal range of motion, No clubbing, cyanosis or edema  Vascular: Peripheral pulses palpable 2+ bilaterally    MEDICATIONS  (STANDING):  atorvastatin 40 milliGRAM(s) Oral at bedtime  budesonide 160 MICROgram(s)/formoterol 4.5 MICROgram(s) Inhaler 2 Puff(s) Inhalation two times a day  cefTRIAXone   IVPB 1000 milliGRAM(s) IV Intermittent every 24 hours  enoxaparin Injectable 40 milliGRAM(s) SubCutaneous daily  losartan 50 milliGRAM(s) Oral daily  metoprolol tartrate 12.5 milliGRAM(s) Oral every 12 hours      LABS:	 	    CARDIAC MARKERS ( 03 Oct 2021 06:05 )  0.132 ng/mL / x     / x     / x     / x      CARDIAC MARKERS ( 02 Oct 2021 22:16 )  0.160 ng/mL / x     / x     / x     / x                                    11.7   4.50  )-----------( 142      ( 03 Oct 2021 06:05 )             35.9     10-03    140  |  109<H>  |  14  ----------------------------<  86  5.2   |  27  |  0.83    Ca    8.3<L>      03 Oct 2021 06:05  Phos  3.1     10-03  Mg     2.0     10-03    TPro  6.5  /  Alb  2.8<L>  /  TBili  0.4  /  DBili  x   /  AST  38  /  ALT  23  /  AlkPhos  53  10-03      Lipid Profile: Cholesterol 149  LDL --  HDL 59  TG 68      TSH: Thyroid Stimulating Hormone, Serum: 1.56 uU/mL (10-03 @ 06:05)        EXAM:  CT ABDOMEN AND PELVIS OC                            PROCEDURE DATE:  10/03/2021          INTERPRETATION:  CLINICAL INFORMATION: Nausea, vomiting    COMPARISON: None.    CONTRAST/COMPLICATIONS:  IV Contrast: NONE  Oral Contrast: Omnipaque 300  Complications: None reported at time of study completion    PROCEDURE:  CT of the Abdomen and Pelvis was performed.  Sagittal and coronal reformats were performed.    FINDINGS:  LOWER CHEST: Clear.    LIVER: Normal.  BILE DUCTS: Nondilated.  GALLBLADDER: Normal.  SPLEEN: Normal.  PANCREAS: Diffuse atrophy.  ADRENALS: Normal.  KIDNEYS/URETERS: No hydronephrosis or urinary tract calculi.    BLADDER: Normal.  REPRODUCTIVE ORGANS: Calcified fibroids. Pessary in place.    BOWEL: No bowel-related abnormality. Specifically, no evidence of acute diverticulitis. Normal appendix and ileocecal region. No bowel obstruction or bowel inflammation.  PERITONEUM: No free air or ascites.  VESSELS: Normal caliber aorta.  RETROPERITONEUM/LYMPH NODES: No adenopathy.  ABDOMINAL WALL: Small fat-containing umbilical hernia. Small fluid collection the left groin, possibly postsurgical.  BONES: No acute bony abnormality.    IMPRESSION:  *  No acute pathology. No obstruction.      TODD LIN MD; Attending Radiologist  This document has been electronically signed. Oct  3 2021  7:19PM

## 2021-10-04 NOTE — PROGRESS NOTE ADULT - PROBLEM SELECTOR PLAN 3
elevated troponin, trend  no active chest pain  ekg shows NSR  tele monitoring  f/u tte  start bb  on asa, statin    Dr. Jmienez consulted  Stress test negative   TTE with mild to moderate MR, trace AR, mild concentric LVH, grossly normal LV systolic function Neuro consulted Dr. Flores recommending:  At risk for posterior circulation stroke in view of age and risk factors  - No IV tpa given because time of onset unknown  - ASA/ PLavix  - Statin  - MRI/A brain  - PT eval

## 2021-10-04 NOTE — PHARMACOTHERAPY INTERVENTION NOTE - COMMENTS
Pt is on [facility]AR PATIENT LIST for Acute MI.  Medication profile is reviewed for appropriateness. No recommendations at this time.

## 2021-10-04 NOTE — PROGRESS NOTE ADULT - ASSESSMENT
81F from home, lives with daughter, w/ PMH HTN, HLD, Asthma, p/w generalized weakness and nausea/vomiting since 10/2. Pt with decreased po intake and decreased appetite and chest pain with borderline troponins.  1.Tele monitoring.  2.Echocardiogram.  3.Stress test-r/o ischemia.  4.Neurology eval noted.  5.HTN-cont bp medication.  6.GI and DVT prophylaxis.

## 2021-10-04 NOTE — PROGRESS NOTE ADULT - PROBLEM SELECTOR PLAN 1
Neuro consulted Dr. Flores recommending:  At risk for posterior circulation stroke in view of age and risk factors  - No IV tpa given because time of onset unknown  - ASA/ PLavix  - Statin  - MRI/A brain  - PT eval p/w generalized weakness, n/v  UA positive  f/u Urine culture  C/w  Rocephin 1g qd

## 2021-10-04 NOTE — PROGRESS NOTE ADULT - SUBJECTIVE AND OBJECTIVE BOX
PGY-1 Progress Note discussed with attending    CHIEF COMPLAINT & BRIEF HOSPITAL COURSE:      INTERVAL HPI/OVERNIGHT EVENTS:   No acute events or new complaints noted    REVIEW OF SYSTEMS:  CONSTITUTIONAL: No fever, weight loss, or fatigue  RESPIRATORY: No cough, wheezing, chills or hemoptysis; No shortness of breath  CARDIOVASCULAR: No chest pain, palpitations, dizziness, or leg swelling  GASTROINTESTINAL: No abdominal pain. No nausea, vomiting, or hematemesis; No diarrhea or constipation. No melena or hematochezia.  GENITOURINARY: No dysuria or hematuria, urinary frequency  NEUROLOGICAL: No headaches, memory loss, loss of strength, numbness, or tremors  SKIN: No itching, burning, rashes, or lesions     MEDICATIONS  (STANDING):  aspirin  chewable 81 milliGRAM(s) Oral daily  atorvastatin 40 milliGRAM(s) Oral at bedtime  budesonide 160 MICROgram(s)/formoterol 4.5 MICROgram(s) Inhaler 2 Puff(s) Inhalation two times a day  cefTRIAXone   IVPB 1000 milliGRAM(s) IV Intermittent every 24 hours  clopidogrel Tablet 75 milliGRAM(s) Oral daily  enoxaparin Injectable 40 milliGRAM(s) SubCutaneous daily  losartan 50 milliGRAM(s) Oral daily  metoprolol tartrate 12.5 milliGRAM(s) Oral every 12 hours    MEDICATIONS  (PRN):  ALBUTerol    90 MICROgram(s) HFA Inhaler 2 Puff(s) Inhalation every 6 hours PRN Shortness of Breath and/or Wheezing      Vital Signs Last 24 Hrs  T(C): 36.4 (04 Oct 2021 12:02), Max: 37.2 (03 Oct 2021 19:44)  T(F): 97.5 (04 Oct 2021 12:02), Max: 98.9 (03 Oct 2021 19:44)  HR: 60 (04 Oct 2021 12:02) (52 - 66)  BP: 143/71 (04 Oct 2021 12:02) (118/91 - 188/86)  BP(mean): --  RR: 18 (04 Oct 2021 12:02) (18 - 18)  SpO2: 97% (04 Oct 2021 12:02) (97% - 100%)    PHYSICAL EXAMINATION:  General - NAD, sitting up in bed, well groomed  Eyes - PERRLA, EOM intact  ENT - Nonicteric sclerae, PERRLA, EOMI. Oropharynx clear. Moist mucous membranes. Conjunctivae appear well perfused.   Neck - No noticeable or palpable swelling, redness or rash around throat or on face  Lymph Nodes - No lymphadenopathy  Cardiovascular - RRR no m/r/g, no JVD, no carotid bruits  Lungs - Clear to auscultation, no use of accessory muscles, no crackles or wheezes.  Skin - No rashes, skin warm and dry, no erythematous areas  Abdomen - Normal bowel sounds, abdomen soft and nontender  Rectal – Rectal exam not performed since no symptoms indicated blood loss.  Extremities - No edema, cyanosis or clubbing  Musculoskeletal - 5/5 strength, normal range of motion, no swollen or erythematous joints.  Neuro– Alert and oriented x 3, CN 2-12 grossly intact.                          12.1   4.44  )-----------( 157      ( 04 Oct 2021 08:19 )             36.4     10-04    140  |  104  |  11  ----------------------------<  90  4.4   |  30  |  0.76    Ca    8.8      04 Oct 2021 08:19  Phos  3.1     10-03  Mg     2.0     10-03    TPro  6.5  /  Alb  3.0<L>  /  TBili  0.5  /  DBili  x   /  AST  20  /  ALT  21  /  AlkPhos  52  10-04    LIVER FUNCTIONS - ( 04 Oct 2021 08:19 )  Alb: 3.0 g/dL / Pro: 6.5 g/dL / ALK PHOS: 52 U/L / ALT: 21 U/L DA / AST: 20 U/L / GGT: x           CARDIAC MARKERS ( 03 Oct 2021 06:05 )  0.132 ng/mL / x     / x     / x     / x      CARDIAC MARKERS ( 02 Oct 2021 22:16 )  0.160 ng/mL / x     / x     / x     / x              I&O's Summary        Culture - Urine (collected 03 Oct 2021 02:01)  Source: Clean Catch Clean Catch (Midstream)  Final Report (03 Oct 2021 22:16):    <10,000 CFU/mL Normal Urogenital Jyothi        CAPILLARY BLOOD GLUCOSE      RADIOLOGY & ADDITIONAL TESTS:

## 2021-10-05 ENCOUNTER — TRANSCRIPTION ENCOUNTER (OUTPATIENT)
Age: 81
End: 2021-10-05

## 2021-10-05 VITALS
HEART RATE: 57 BPM | TEMPERATURE: 98 F | OXYGEN SATURATION: 98 % | DIASTOLIC BLOOD PRESSURE: 47 MMHG | SYSTOLIC BLOOD PRESSURE: 127 MMHG | RESPIRATION RATE: 18 BRPM

## 2021-10-05 PROCEDURE — 83036 HEMOGLOBIN GLYCOSYLATED A1C: CPT

## 2021-10-05 PROCEDURE — 70551 MRI BRAIN STEM W/O DYE: CPT

## 2021-10-05 PROCEDURE — 74176 CT ABD & PELVIS W/O CONTRAST: CPT

## 2021-10-05 PROCEDURE — 87086 URINE CULTURE/COLONY COUNT: CPT

## 2021-10-05 PROCEDURE — A9502: CPT

## 2021-10-05 PROCEDURE — 80053 COMPREHEN METABOLIC PANEL: CPT

## 2021-10-05 PROCEDURE — 93017 CV STRESS TEST TRACING ONLY: CPT

## 2021-10-05 PROCEDURE — 96374 THER/PROPH/DIAG INJ IV PUSH: CPT

## 2021-10-05 PROCEDURE — 93005 ELECTROCARDIOGRAM TRACING: CPT

## 2021-10-05 PROCEDURE — 94640 AIRWAY INHALATION TREATMENT: CPT

## 2021-10-05 PROCEDURE — 81001 URINALYSIS AUTO W/SCOPE: CPT

## 2021-10-05 PROCEDURE — 93306 TTE W/DOPPLER COMPLETE: CPT

## 2021-10-05 PROCEDURE — 70544 MR ANGIOGRAPHY HEAD W/O DYE: CPT

## 2021-10-05 PROCEDURE — 83690 ASSAY OF LIPASE: CPT

## 2021-10-05 PROCEDURE — 84484 ASSAY OF TROPONIN QUANT: CPT

## 2021-10-05 PROCEDURE — 97161 PT EVAL LOW COMPLEX 20 MIN: CPT

## 2021-10-05 PROCEDURE — 78452 HT MUSCLE IMAGE SPECT MULT: CPT

## 2021-10-05 PROCEDURE — 86769 SARS-COV-2 COVID-19 ANTIBODY: CPT

## 2021-10-05 PROCEDURE — 84100 ASSAY OF PHOSPHORUS: CPT

## 2021-10-05 PROCEDURE — 71045 X-RAY EXAM CHEST 1 VIEW: CPT

## 2021-10-05 PROCEDURE — 80061 LIPID PANEL: CPT

## 2021-10-05 PROCEDURE — 99232 SBSQ HOSP IP/OBS MODERATE 35: CPT

## 2021-10-05 PROCEDURE — 82962 GLUCOSE BLOOD TEST: CPT

## 2021-10-05 PROCEDURE — 84443 ASSAY THYROID STIM HORMONE: CPT

## 2021-10-05 PROCEDURE — 99285 EMERGENCY DEPT VISIT HI MDM: CPT | Mod: 25

## 2021-10-05 PROCEDURE — 36415 COLL VENOUS BLD VENIPUNCTURE: CPT

## 2021-10-05 PROCEDURE — 87635 SARS-COV-2 COVID-19 AMP PRB: CPT

## 2021-10-05 PROCEDURE — 85025 COMPLETE CBC W/AUTO DIFF WBC: CPT

## 2021-10-05 PROCEDURE — 93970 EXTREMITY STUDY: CPT

## 2021-10-05 PROCEDURE — 83735 ASSAY OF MAGNESIUM: CPT

## 2021-10-05 RX ORDER — ASPIRIN/CALCIUM CARB/MAGNESIUM 324 MG
1 TABLET ORAL
Qty: 30 | Refills: 0
Start: 2021-10-05 | End: 2021-11-03

## 2021-10-05 RX ORDER — LOSARTAN POTASSIUM 100 MG/1
1 TABLET, FILM COATED ORAL
Qty: 0 | Refills: 0 | DISCHARGE
Start: 2021-10-05

## 2021-10-05 RX ORDER — LOSARTAN POTASSIUM 100 MG/1
50 TABLET, FILM COATED ORAL
Refills: 0 | Status: DISCONTINUED | OUTPATIENT
Start: 2021-10-05 | End: 2021-10-05

## 2021-10-05 RX ADMIN — LOSARTAN POTASSIUM 50 MILLIGRAM(S): 100 TABLET, FILM COATED ORAL at 05:28

## 2021-10-05 RX ADMIN — Medication 81 MILLIGRAM(S): at 11:45

## 2021-10-05 RX ADMIN — ENOXAPARIN SODIUM 40 MILLIGRAM(S): 100 INJECTION SUBCUTANEOUS at 11:45

## 2021-10-05 RX ADMIN — CLOPIDOGREL BISULFATE 75 MILLIGRAM(S): 75 TABLET, FILM COATED ORAL at 11:45

## 2021-10-05 RX ADMIN — Medication 12.5 MILLIGRAM(S): at 05:28

## 2021-10-05 RX ADMIN — BUDESONIDE AND FORMOTEROL FUMARATE DIHYDRATE 2 PUFF(S): 160; 4.5 AEROSOL RESPIRATORY (INHALATION) at 10:30

## 2021-10-05 RX ADMIN — CEFTRIAXONE 100 MILLIGRAM(S): 500 INJECTION, POWDER, FOR SOLUTION INTRAMUSCULAR; INTRAVENOUS at 00:12

## 2021-10-05 NOTE — DISCHARGE NOTE PROVIDER - CARE PROVIDER_API CALL
Aguilar Carroll  INTERNAL MEDICINE  [address] [facility] [place] [address]  Phone: [phone]  Fax: [phone]  Follow Up Time:     Mirella Jimenez  INTERNAL MEDICINE  [address] [place] [address]  Phone: [phone]  Fax: [phone]  Follow Up Time:

## 2021-10-05 NOTE — DISCHARGE NOTE PROVIDER - NSDCMRMEDTOKEN_GEN_ALL_CORE_FT
Crestor 10 mg oral tablet: 1 tab(s) orally once a day (at bedtime)  Dulera 200 mcg-5 mcg/inh inhalation aerosol: 2 puff(s) inhaled 2 times a day  losartan 50 mg oral tablet: 1 tab(s) orally once a day  olopatadine 0.2% ophthalmic solution: 1 drop(s) to each affected eye once a day  Restasis 0.05% ophthalmic emulsion: 1 drop(s) to each affected eye every 12 hours  Ventolin HFA 90 mcg/inh inhalation aerosol: 2 puff(s) inhaled 4 times a day, As Needed   Crestor 10 mg oral tablet: 1 tab(s) orally once a day (at bedtime)  Dulera 200 mcg-5 mcg/inh inhalation aerosol: 2 puff(s) inhaled 2 times a day  losartan 50 mg oral tablet: 1 tab(s) orally 2 times a day  olopatadine 0.2% ophthalmic solution: 1 drop(s) to each affected eye once a day  Restasis 0.05% ophthalmic emulsion: 1 drop(s) to each affected eye every 12 hours  Ventolin HFA 90 mcg/inh inhalation aerosol: 2 puff(s) inhaled 4 times a day, As Needed

## 2021-10-05 NOTE — DISCHARGE NOTE PROVIDER - NSDCCPCAREPLAN_GEN_ALL_CORE_FT
PRINCIPAL DISCHARGE DIAGNOSIS  Diagnosis: UTI (urinary tract infection)  Assessment and Plan of Treatment: You were admitted to the hospital with a urinary tract infection and completed a three day course of intravenous antibiotics. You are recommended to follow up with your primary care provider within 1-2 weeks of hospital discharge.      SECONDARY DISCHARGE DIAGNOSES  Diagnosis: Elevated troponin  Assessment and Plan of Treatment: You were admitted to the hospital with elevated cardiac enzymes and had a transthoracic echocardiogram and stress test which were normal. You were evaluated by a cardiologist who recommended repeat echocardiogram in one year. You are recommended to follow up with your primary care provider within 1-2 weeks of hospital discharge.    Diagnosis: Weakness  Assessment and Plan of Treatment: You were noted to have weakness and you were evaluated by a neurologist who recommended MR head which was negative for acute stroke. You are recommended to follow up with your primary care provider within 1-2 weeks of hospital discharge.    Diagnosis: HTN (hypertension)  Assessment and Plan of Treatment: You were noted to have a history of hypertension and your medications were adjusted. You are recommended to continue your medications as prescribed and follow up with your primary care provider within 1-2 weeks of hospital discharge.    Diagnosis: Asthma  Assessment and Plan of Treatment: You were noted to have a history of asthma and are recommended to continue your medications as prescribed and follow up with your primary care provider within 1-2 weeks of hospital discharge.    Diagnosis: Hyperlipidemia  Assessment and Plan of Treatment: You were noted to have a history of hyperlipidemia and are recommended to continue your medications as prescribed and follow up with your primary care provider within 1-2 weeks of hospital discharge.    Diagnosis: GERD (gastroesophageal reflux disease)  Assessment and Plan of Treatment: You were noted to have a history of asthma and are recommended to continue your medications as prescribed and follow up with your primary care provider within 1-2 weeks of hospital discharge.

## 2021-10-05 NOTE — GOALS OF CARE CONVERSATION - ADVANCED CARE PLANNING - CONVERSATION DETAILS
Patient had stated that in the case that her heart stops and she requires cardiopulmonary resuscitation, that she would like to have CPR and that she would like to be intubated if the clinical scenario arises. Patient code status is Full Code.

## 2021-10-05 NOTE — PROGRESS NOTE ADULT - SUBJECTIVE AND OBJECTIVE BOX
CHIEF COMPLAINT:Patient is a 81y old  Female who presents with a chief complaint of Generalized weakness.Pt appears comfortable.    	  REVIEW OF SYSTEMS:  CONSTITUTIONAL: No fever, weight loss, or fatigue  EYES: No eye pain, visual disturbances, or discharge  ENT:  No difficulty hearing, tinnitus, vertigo; No sinus or throat pain  NECK: No pain or stiffness  RESPIRATORY: No cough, wheezing, chills or hemoptysis; No Shortness of Breath  CARDIOVASCULAR: No chest pain, palpitations, passing out, dizziness, or leg swelling  GASTROINTESTINAL: No abdominal or epigastric pain. No nausea, vomiting, or hematemesis; No diarrhea or constipation. No melena or hematochezia.  GENITOURINARY: No dysuria, frequency, hematuria, or incontinence  NEUROLOGICAL: No headaches, memory loss, loss of strength, numbness, or tremors  SKIN: No itching, burning, rashes, or lesions   LYMPH Nodes: No enlarged glands  ENDOCRINE: No heat or cold intolerance; No hair loss  MUSCULOSKELETAL: No joint pain or swelling; No muscle, back, or extremity pain  PSYCHIATRIC: No depression, anxiety, mood swings, or difficulty sleeping  HEME/LYMPH: No easy bruising, or bleeding gums  ALLERGY AND IMMUNOLOGIC: No hives or eczema	      PHYSICAL EXAM:  T(C): 36.6 (10-05-21 @ 08:08), Max: 36.6 (10-04-21 @ 23:58)  HR: 52 (10-05-21 @ 08:08) (52 - 62)  BP: 148/46 (10-05-21 @ 08:08) (143/71 - 182/83)  RR: 18 (10-05-21 @ 08:08) (17 - 18)  SpO2: 97% (10-05-21 @ 08:08) (95% - 100%)  Wt(kg): --  I&O's Summary      Appearance: Normal	  HEENT:   Normal oral mucosa, PERRL, EOMI	  Lymphatic: No lymphadenopathy  Cardiovascular: Normal S1 S2, No JVD, No murmurs, No edema  Respiratory: Lungs clear to auscultation	  Psychiatry: A & O x 3, Mood & affect appropriate  Gastrointestinal:  Soft, Non-tender, + BS	  Skin: No rashes, No ecchymoses, No cyanosis	  Neurologic: Non-focal  Extremities: Normal range of motion, No clubbing, cyanosis or edema  Vascular: Peripheral pulses palpable 2+ bilaterally    MEDICATIONS  (STANDING):  aspirin  chewable 81 milliGRAM(s) Oral daily  atorvastatin 40 milliGRAM(s) Oral at bedtime  budesonide 160 MICROgram(s)/formoterol 4.5 MICROgram(s) Inhaler 2 Puff(s) Inhalation two times a day  clopidogrel Tablet 75 milliGRAM(s) Oral daily  enoxaparin Injectable 40 milliGRAM(s) SubCutaneous daily  losartan 50 milliGRAM(s) Oral daily  metoprolol tartrate 12.5 milliGRAM(s) Oral every 12 hours      	  	  LABS:	 	                        12.1   4.44  )-----------( 157      ( 04 Oct 2021 08:19 )             36.4     10-04    140  |  104  |  11  ----------------------------<  90  4.4   |  30  |  0.76    Ca    8.8      04 Oct 2021 08:19    TPro  6.5  /  Alb  3.0<L>  /  TBili  0.5  /  DBili  x   /  AST  20  /  ALT  21  /  AlkPhos  52  10-04    proBNP:   Lipid Profile: Cholesterol 149  LDL --  HDL 59  TG 68    HgA1c:   TSH: Thyroid Stimulating Hormone, Serum: 1.56 uU/mL (10-03 @ 06:05)      	      Nuclear Stress Test-Pharmacologic (10.04.21 @ 07:58)  IMPRESSIONS:Normal Study  * Negative ECG evidence of ischemia after IV of Lexiscan.  * Review of raw data shows:The study is of good technical  quality.  * The left ventricle was normal in size. Normal myocardial  perfusion scan, with no evidence of infarction or  inducible ischemia.  * Gated wall motion analysis is performed, and shows  normal wall motion with post stress LVEF of 66%.        < from: Transthoracic Echocardiogram (10.03.21 @ 06:54) >  ------------------------------------------------------------------------  OBSERVATIONS:  Mitral Valve: Normal mitral valve. Mild to moderate mitral  regurgitation.  Aortic Root: Normal aortic root.  Aortic Valve: Aortic valve leaflet morphology not well  visualized, opens normally. Trace aortic regurgitation.  Left Atrium: Normal left atrium.  LA volume index = 24  cc/m2.  Left Ventricle: Endocardium not well visualized; grossly  normal left ventricular systolic function. Mild concentric  left ventricular hypertrophy.  Right Heart: Normal right atrium. Normal right ventricular  size and function. There is mild tricuspid regurgitation.  There is trace pulmonic regurgitation.  Pericardium/PleuraNormal pericardium with no pericardial  effusion.  Hemodynamic: RA Pressure is 10 mm Hg. RV systolic pressure  is 35 mm Hg.    r< from: MR Head No Cont (10.04.21 @ 18:00) >  EXAM:  MR ANGIO BRAIN                          EXAM:  MR BRAIN                            PROCEDURE DATE:  10/04/2021          INTERPRETATION:  MRI OF THE BRAIN WITHOUT CONTRAST AND MR ANGIOGRAM BRAIN WITHOUT CONTRAST.    CLINICAL INDICATION: Dizziness, rule out CVA.    TECHNIQUE: Multiplanar multisequence noncontrast MRI of the brain was performed. 3-D time-of-flight imaging of the [place] without contrast. 3-D or MIP reconstructions were performed.      COMPARISON: CT brain, 2/12/2020.    FINDINGS:  MRI brain:  The ventricular and sulcal size and configuration is age appropriate. There are scattered T2/FLAIR hyperintensities in the subcortical and periventricular white matter which are nonspecific but most commonly represent chronic microvascular ischemic changes. There is a chronic small  non-extensive infarct in the right temporal lobe, stable.    There is no acute infarction, intracranial hemorrhage, shift or herniation. There is no abnormal extra-axial fluid collection orhydrocephalus. There is susceptibility signal at the ependymal aspect of the left posterior ventricular horn which may represent a small cavernoma. Again noted is the dural based lesion in the right superior frontal convexity appreciated with susceptibility signal measuring 1 cm calcific density seen in the prior comparison, most likely represent a small meningioma.    The flow-voids of the major central arterial circulation at the skull base are normal, suggestive of of their patency.      Therehas been bilateral cataract surgery.    The paranasal sinuses and tympanomastoid air cells are clear.    ------------------------------------  MRI brain:  The bilateral petrous and cavernous carotid arteries are patent. The bilateral anterior and middle cerebral arteries are patent.    The distal vertebral arteries are patent. The basilar artery is patent. The posterior cerebral arteries and superior cerebellar arteries are patent.    There is no evidence of substantial arterial stenosis or occlusion.  There is no evidence of a dominant aneurysm or vascular malformation.      IMPRESSION:    There is no acute infarction, acute hemorrhage, cerebral edema, or intracranial mass effect.    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    There is nosubstantial intracranial stenosis or large vessel occlusion.    --- End of Report ---            WANDER BRITO MD; Attending Radiologist  This document has been electronically signed. Oct  4 2021  5:01PM    < end of copied text >

## 2021-10-05 NOTE — PROGRESS NOTE ADULT - ASSESSMENT
No evidence of stroke; will discuss with attending and patient No evidence of stroke; discussed with patient to follow up at [address] [place], [place] if symptoms need physician's help again

## 2021-10-05 NOTE — PROGRESS NOTE ADULT - ASSESSMENT
81F from home, lives with daughter, w/ PMH HTN, HLD, Asthma, p/w generalized weakness and nausea/vomiting since 10/2. Pt with decreased po intake and decreased appetite and chest pain with borderline troponins.  1.Tele monitoring.  2.Echocardiogram repeat in 1 year.  3.HTN-d/c lopresor, inc cozaar 50mg bid  4.GI and DVT prophylaxis.

## 2021-10-05 NOTE — DISCHARGE NOTE PROVIDER - HOSPITAL COURSE
Patient is an 81 year old F who lives with daughter w/ PMH HTN, HLD, Asthma, p/w generalized weakness and nausea/vomiting since 10/2. Pt with decreased po intake and decreased appetite admitted for UTI and treated with 3 days of IV ceftriaxone. Also admitted with borderline elevated troponins, telemonitoring and EKG without changes. TTE was done and showed  mild to moderate MR, trace AR, mild concentric LVH. Stress test with no evidence of ischemia or infarction LVEF 66%. For HTN, lopressor was discontinued and started on cozaar 50 mg bid as per cardiology. Patient also noted with weakness and evaluated by neurology who recommended MR head which showed no acute pathology. Pt evaluated by neurology and recommended outpatient follow up if needed. Patient was deemed medically stable for discharge by primary medical team.

## 2021-10-05 NOTE — DISCHARGE NOTE NURSING/CASE MANAGEMENT/SOCIAL WORK - PATIENT PORTAL LINK FT
You can access the FollowMyHealth Patient Portal offered by [facility] by registering at the following website: [email or web address]. By joining [employer]’s FollowMyHealth portal, you will also be able to view your health information using other applications (apps) compatible with our system.

## 2021-10-05 NOTE — PROGRESS NOTE ADULT - SUBJECTIVE AND OBJECTIVE BOX
INTERVAL HPI/OVERNIGHT EVENTS:     HEALTH ISSUES - PROBLEM Dx:  HTN (hypertension)    Asthma    Hyperlipidemia    GERD (gastroesophageal reflux disease)    Prophylactic measure    UTI (urinary tract infection)    Elevated troponin    Weakness            MEDICATIONS  (STANDING):  aspirin  chewable 81 milliGRAM(s) Oral daily  atorvastatin 40 milliGRAM(s) Oral at bedtime  budesonide 160 MICROgram(s)/formoterol 4.5 MICROgram(s) Inhaler 2 Puff(s) Inhalation two times a day  clopidogrel Tablet 75 milliGRAM(s) Oral daily  enoxaparin Injectable 40 milliGRAM(s) SubCutaneous daily  losartan 50 milliGRAM(s) Oral daily  metoprolol tartrate 12.5 milliGRAM(s) Oral every 12 hours    MEDICATIONS  (PRN):  ALBUTerol    90 MICROgram(s) HFA Inhaler 2 Puff(s) Inhalation every 6 hours PRN Shortness of Breath and/or Wheezing      Allergies    No Known Allergies    Intolerances        REVIEW OF SYSTEMS        Vital Signs Last 24 Hrs  T(C): 36.6 (05 Oct 2021 08:08), Max: 36.6 (04 Oct 2021 23:58)  T(F): 97.9 (05 Oct 2021 08:08), Max: 97.9 (04 Oct 2021 23:58)  HR: 52 (05 Oct 2021 08:08) (52 - 62)  BP: 148/46 (05 Oct 2021 08:08) (143/71 - 182/83)  BP(mean): --  RR: 18 (05 Oct 2021 08:08) (17 - 18)  SpO2: 97% (05 Oct 2021 08:08) (95% - 100%)    PHYSICAL EXAM:      Constitutional: awake and alert.  HEENT: PERRLA, EOMI,   Neck: Supple.  Respiratory: Breath sounds are clear bilaterally  Cardiovascular: S1 and S2, regular / irregular rhythm  Gastrointestinal: soft, nontender  Extremities:  no edema  Vascular: Caritid Bruit - no  Musculoskeletal: no joint swelling/tenderness, no abnormal movements  Skin: No rashes    Neurological exam:  HF: A x O x 3. Appropriately interactive, normal affect. Speech fluent, No Aphasia or paraphasic errors. Naming /repetition intact   CN: ANTHONY, EOMI, VFF, facial sensation normal, no NLFD, tongue midline, Palate moves equally, SCM equal bilaterally  Motor: No pronator drift, Strength 5/5 in all 4 ext, normal bulk and tone, no tremor, rigidity or bradykinesia.    Sens: Intact to light touch / PP/ VS/ JS    Reflexes: Symmetric and normal . BJ 2+, BR 2+, KJ 2+, AJ 2+, downgoing toes b/l  Coord:  No FNFA, dysmetria, SUSAN intact   Gait/Balance: Normal/Cannot test    LABS:                        12.1   4.44  )-----------( 157      ( 04 Oct 2021 08:19 )             36.4     10-04    140  |  104  |  11  ----------------------------<  90  4.4   |  30  |  0.76    Ca    8.8      04 Oct 2021 08:19    TPro  6.5  /  Alb  3.0<L>  /  TBili  0.5  /  DBili  x   /  AST  20  /  ALT  21  /  AlkPhos  52  10-04          RADIOLOGY & ADDITIONAL TESTS:  < from: MR Head No Cont (10.04.21 @ 18:00) >    EXAM:  MR ANGIO BRAIN                          EXAM:  MR BRAIN                            PROCEDURE DATE:  10/04/2021          INTERPRETATION:  MRI OF THE BRAIN WITHOUT CONTRAST AND MR ANGIOGRAM BRAIN WITHOUT CONTRAST.    CLINICAL INDICATION: Dizziness, rule out CVA.    TECHNIQUE: Multiplanar multisequence noncontrast MRI of the brain was performed. 3-D time-of-flight imaging of the [place] of [place] without contrast. 3-D or MIP reconstructions were performed.      COMPARISON: CT brain, 2/12/2020.    FINDINGS:  MRI brain:  The ventricular and sulcal size and configuration is age appropriate. There are scattered T2/FLAIR hyperintensities in the subcortical and periventricular white matter which are nonspecific but most commonly represent chronic microvascular ischemic changes. There is a chronic small  non-extensive infarct in the right temporal lobe, stable.    There is no acute infarction, intracranial hemorrhage, shift or herniation. There is no abnormal extra-axial fluid collection orhydrocephalus. There is susceptibility signal at the ependymal aspect of the left posterior ventricular horn which may represent a small cavernoma. Again noted is the dural based lesion in the right superior frontal convexity appreciated with susceptibility signal measuring 1 cm calcific density seen in the prior comparison, most likely represent a small meningioma.    The flow-voids of the major central arterial circulation at the skull base are normal, suggestive of of their patency.      Therehas been bilateral cataract surgery.    The paranasal sinuses and tympanomastoid air cells are clear.    ------------------------------------  MRI brain:  The bilateral petrous and cavernous carotid arteries are patent. The bilateral anterior and middle cerebral arteries are patent.    The distal vertebral arteries are patent. The basilar artery is patent. The posterior cerebral arteries and superior cerebellar arteries are patent.    There is no evidence of substantial arterial stenosis or occlusion.  There is no evidence of a dominant aneurysm or vascular malformation.      IMPRESSION:    There is no acute infarction, acute hemorrhage, cerebral edema, or intracranial mass effect.    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    There is nosubstantial intracranial stenosis or large vessel occlusion.    --- End of Report ---            WANDER BRITO MD; Attending Radiologist  This document has been electronically signed. Oct  4 2021  5:01PM    < end of copied text >   INTERVAL HPI/OVERNIGHT EVENTS:  Feels stronger; denies weakness; blieves the weakness was brought hupon by not eating due to comiting for many days before presentation to the ER    HEALTH ISSUES - PROBLEM Dx:  HTN (hypertension)    Asthma    Hyperlipidemia    GERD (gastroesophageal reflux disease)    Prophylactic measure    UTI (urinary tract infection)    Elevated troponin    Weakness            MEDICATIONS  (STANDING):  aspirin  chewable 81 milliGRAM(s) Oral daily  atorvastatin 40 milliGRAM(s) Oral at bedtime  budesonide 160 MICROgram(s)/formoterol 4.5 MICROgram(s) Inhaler 2 Puff(s) Inhalation two times a day  clopidogrel Tablet 75 milliGRAM(s) Oral daily  enoxaparin Injectable 40 milliGRAM(s) SubCutaneous daily  losartan 50 milliGRAM(s) Oral daily  metoprolol tartrate 12.5 milliGRAM(s) Oral every 12 hours    MEDICATIONS  (PRN):  ALBUTerol    90 MICROgram(s) HFA Inhaler 2 Puff(s) Inhalation every 6 hours PRN Shortness of Breath and/or Wheezing      Allergies    No Known Allergies    Intolerances        REVIEW OF SYSTEMS    REVIEW OF SYSTEMS:    CONSTITUTIONAL: No weakness, fatigue, malaise, fevers or chills, no weight change, appetite change  EYES: No visual changes; No double vision,  No vertigo, eye pain  Ears: no otalgia, no otorhea, no hearing loss, tinnitus  Nose: no epistaxis, rhinorrhea, post-discharge, sinus pressure  Throat: no throat pain, no oral lesions, tooth pain   NECK: No pain or stiffness  RESPIRATORY: No cough (productive or dry), wheezing, hemoptysis; No shortness of breath, orthnopnea, PND, BONILLA, snoring,  CARDIOVASCULAR: No chest pain or palpitations, no leg edema, no claudication    GASTROINTESTINAL: No abdominal or epigastric pain. No nausea, vomiting, or hematemesis; No diarrhea or constipation. No melena or hematochezia.  GENITOURINARY: No dysuria, frequency, urgency or hematuria, no pelvic pain, urinary incontinence, urgency  Muscloskeletal: no joints or muscle pain, no swelling in joints or muscles  NEUROLOGICAL: No numbness or weakness, headache, memory loss, seizures, dizziness, vertigo, syncope, ataxia  SKIN: No pruritis, rashes, lesions or new moles  Psych: No anxiety, sadness, insomnia, suicide thoughts  Endocrine: No Heat or Cold intolerance, polydipsia, polyphagia  Heme/Lymph: no LN enlargement, no easy bruising or bleeding         Vital Signs Last 24 Hrs  T(C): 36.6 (05 Oct 2021 08:08), Max: 36.6 (04 Oct 2021 23:58)  T(F): 97.9 (05 Oct 2021 08:08), Max: 97.9 (04 Oct 2021 23:58)  HR: 52 (05 Oct 2021 08:08) (52 - 62)  BP: 148/46 (05 Oct 2021 08:08) (143/71 - 182/83)  BP(mean): --  RR: 18 (05 Oct 2021 08:08) (17 - 18)  SpO2: 97% (05 Oct 2021 08:08) (95% - 100%)    PHYSICAL EXAM:      Constitutional: awake and alert.  HEENT: PERRLA, EOMI,   Neck: Supple.  Respiratory: Breath sounds are clear bilaterally  Cardiovascular: S1 and S2, regular rhythm  Gastrointestinal: soft, nontender  Extremities:  no edema  Vascular: Carotid Bruit - no  Musculoskeletal: no joint swelling/tenderness, no abnormal movements  Skin: No rashes    Neurological exam:  HF: A x O x 3. Appropriately interactive, normal affect. Speech fluent, No Aphasia or paraphasic errors. Naming /repetition intact   CN: ANTHONY, EOMI, VFF, facial sensation normal, no NLFD, tongue midline, Palate moves equally, SCM equal bilaterally  Motor: No pronator drift, Strength 5/5 in all 4 ext, normal bulk and tone, no tremor, rigidity or bradykinesia.    Sens: Intact to light touch / PP/ VS/ JS    Reflexes: Symmetric and normal . BJ 2+, BR 2+, KJ 2+, AJ 2+, downgoing toes b/l  Coord:  No FNFA, dysmetria, SUSAN intact   Gait/Balance: Normal/Cannot test    LABS:                        12.1   4.44  )-----------( 157      ( 04 Oct 2021 08:19 )             36.4     10-04    140  |  104  |  11  ----------------------------<  90  4.4   |  30  |  0.76    Ca    8.8      04 Oct 2021 08:19    TPro  6.5  /  Alb  3.0<L>  /  TBili  0.5  /  DBili  x   /  AST  20  /  ALT  21  /  AlkPhos  52  10-04          RADIOLOGY & ADDITIONAL TESTS:  < from: MR Head No Cont (10.04.21 @ 18:00) >    EXAM:  MR ANGIO BRAIN                          EXAM:  MR BRAIN                            PROCEDURE DATE:  10/04/2021          INTERPRETATION:  MRI OF THE BRAIN WITHOUT CONTRAST AND MR ANGIOGRAM BRAIN WITHOUT CONTRAST.    CLINICAL INDICATION: Dizziness, rule out CVA.    TECHNIQUE: Multiplanar multisequence noncontrast MRI of the brain was performed. 3-D time-of-flight imaging of the [place] of [place] without contrast. 3-D or MIP reconstructions were performed.      COMPARISON: CT brain, 2/12/2020.    FINDINGS:  MRI brain:  The ventricular and sulcal size and configuration is age appropriate. There are scattered T2/FLAIR hyperintensities in the subcortical and periventricular white matter which are nonspecific but most commonly represent chronic microvascular ischemic changes. There is a chronic small  non-extensive infarct in the right temporal lobe, stable.    There is no acute infarction, intracranial hemorrhage, shift or herniation. There is no abnormal extra-axial fluid collection orhydrocephalus. There is susceptibility signal at the ependymal aspect of the left posterior ventricular horn which may represent a small cavernoma. Again noted is the dural based lesion in the right superior frontal convexity appreciated with susceptibility signal measuring 1 cm calcific density seen in the prior comparison, most likely represent a small meningioma.    The flow-voids of the major central arterial circulation at the skull base are normal, suggestive of of their patency.      Therehas been bilateral cataract surgery.    The paranasal sinuses and tympanomastoid air cells are clear.    ------------------------------------  MRI brain:  The bilateral petrous and cavernous carotid arteries are patent. The bilateral anterior and middle cerebral arteries are patent.    The distal vertebral arteries are patent. The basilar artery is patent. The posterior cerebral arteries and superior cerebellar arteries are patent.    There is no evidence of substantial arterial stenosis or occlusion.  There is no evidence of a dominant aneurysm or vascular malformation.      IMPRESSION:    There is no acute infarction, acute hemorrhage, cerebral edema, or intracranial mass effect.    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    There is nosubstantial intracranial stenosis or large vessel occlusion.    --- End of Report ---            WANDER SIDERAS MD; Attending Radiologist  This document has been electronically signed. Oct  4 2021  5:01PM    < end of copied text >

## 2022-01-12 NOTE — ED PROVIDER NOTE - MEDICAL DECISION MAKING DETAILS
Patient calls for refill on her Advair inhaler. Current on appts. RX given per protocol.    79 y/o F pt presents with vertigo every time she stands s/p head injury yesterday. Will check, labs, CT head and reassess.

## 2022-01-28 NOTE — DISCHARGE NOTE ADULT - PLAN OF CARE
Chief Complaint:          Chief Complaint   Patient presents with   • Nephrolithiasis     New patient       HPI:   31 y.o. female new patient referred for evaluation of stones.  She passed a few stones.  Started in January.  She had nausea no vomiting no fevers.  Her grandmother had kidney disease she has had positive family history.  She had a CT scan that was done in [place].  And showed a 1.4 cm right adrenal mass.  Nonobstructing tiny stones.  No blockage.  Fluid-filled bowel containing unknown contents and she is status post a hysterectomy.  Her KUB showed bilateral tiny stones.  Consistent with nephrocalcinosis.  I do not have a distribution I will get an MRI to confirm this to be an adrenal adenoma and I will see her back based on this.    Past Medical History:        Past Medical History:   Diagnosis Date   • Anemia 2017   • Arthritis 2009   • Constipation    • COPD (chronic obstructive pulmonary disease) (HCC)    • Esophagitis, reflux    • Fatty liver 2/10/2020    [facility]   • GERD (gastroesophageal reflux disease) 2009   • Hypertension    • Irritable bowel    • Lumbago    • Memory loss    • Restless legs          Current Meds:     Current Outpatient Medications   Medication Sig Dispense Refill   • albuterol sulfate  (90 Base) MCG/ACT inhaler Inhale 2 puffs Every 4 (Four) Hours As Needed for Shortness of Air. 18 g 2   • cyclobenzaprine (FLEXERIL) 5 MG tablet Take 1 tablet by mouth three times daily as needed for muscle spasm 90 tablet 0   • estradiol (ESTRACE VAGINAL) 0.1 MG/GM vaginal cream Daily x7 days then twice weekly 1 each 12   • Fluticasone Furoate-Vilanterol (Breo Ellipta) 100-25 MCG/INH inhaler Inhale 1 puff Daily. 1 inhaler 5   • lansoprazole (PREVACID SOLUTAB) 30 MG Tablet Delayed Release Dispersible disintegrating tablet Take 1 tablet by mouth Daily. 30 tablet 5   • linaclotide (LINZESS) 290 MCG capsule capsule Take 1 capsule by mouth Every Morning Before Breakfast. 48  capsule 0   • metoprolol succinate XL (TOPROL-XL) 25 MG 24 hr tablet Take 1 tablet by mouth once daily 30 tablet 0     No current facility-administered medications for this visit.        Allergies:      Allergies   Allergen Reactions   • Azithromycin GI Intolerance        Past Surgical History:     Past Surgical History:   Procedure Laterality Date   • ANKLE SURGERY     • CHOLECYSTECTOMY     • COLONOSCOPY  2011   • COLONOSCOPY N/A 8/31/2020    Procedure: COLONOSCOPY CPT CODE: 94360;  Surgeon: Bhupinder Orta MD;  Location: [facility];  Service: Gastroenterology;  Laterality: N/A;   • ENDOSCOPY N/A 8/31/2020    Procedure: ESOPHAGOGASTRODUODENOSCOPY WITH BIOPSY CPT CODE: 40530;  Surgeon: Bhupinder Orta MD;  Location: [facility] OR;  Service: Gastroenterology;  Laterality: N/A;   • TUBAL ABDOMINAL LIGATION     • UPPER GASTROINTESTINAL ENDOSCOPY  02/27/2020    [facility]         Social History:     Social History     Socioeconomic History   • Marital status:      Spouse name: betty   • Number of children: 1   • Years of education: 12   Tobacco Use   • Smoking status: Current Every Day Smoker     Packs/day: 1.00     Years: 15.00     Pack years: 15.00     Types: Cigarettes   • Smokeless tobacco: Never Used   Substance and Sexual Activity   • Alcohol use: No   • Drug use: No   • Sexual activity: Yes     Partners: Male     Birth control/protection: Surgical       Family History:     Family History   Problem Relation Age of Onset   • Diabetes Mother    • Hypertension Mother    • Arthritis Father    • Diabetes Father    • Birth defects Sister    • Kidney disease Sister    • Asthma Brother    • Diabetes Brother    • Hypertension Brother    • Thyroid disease Brother    • COPD Maternal Uncle    • Cancer Maternal Uncle         Bladder   • Colon cancer Maternal Uncle    • Arthritis Maternal Grandmother    • Cancer Maternal Grandmother         Kidney and liver   • Diabetes Maternal Grandmother     • Hypertension Maternal Grandmother    • Liver disease Maternal Grandmother    • Cirrhosis Maternal Grandmother    • Liver cancer Maternal Grandmother    • Breast cancer Maternal Aunt    • Cancer Maternal Uncle         Lung and bone   • Cancer Paternal Grandmother         Colon   • Colon cancer Paternal Grandmother    • Cirrhosis Maternal Grandfather        Review of Systems:     Review of Systems   Constitutional: Negative.  Negative for activity change, appetite change, chills, diaphoresis, fatigue and unexpected weight change.   HENT: Negative for congestion, dental problem, drooling, ear discharge, ear pain, facial swelling, hearing loss, mouth sores, nosebleeds, postnasal drip, rhinorrhea, sinus pressure, sneezing, sore throat, tinnitus, trouble swallowing and voice change.    Eyes: Negative.  Negative for photophobia, pain, discharge, redness, itching and visual disturbance.   Respiratory: Negative.  Negative for apnea, cough, choking, chest tightness, shortness of breath, wheezing and stridor.    Cardiovascular: Negative.  Negative for chest pain, palpitations and leg swelling.   Gastrointestinal: Negative.  Negative for abdominal distention, abdominal pain, anal bleeding, blood in stool, constipation, diarrhea, nausea, rectal pain and vomiting.   Endocrine: Negative.  Negative for cold intolerance, heat intolerance, polydipsia, polyphagia and polyuria.   Musculoskeletal: Negative.  Negative for arthralgias, back pain, gait problem, joint swelling, myalgias, neck pain and neck stiffness.   Skin: Negative.  Negative for color change, pallor, rash and wound.   Allergic/Immunologic: Negative.  Negative for environmental allergies, food allergies and immunocompromised state.   Neurological: Negative.  Negative for dizziness, tremors, seizures, syncope, facial asymmetry, speech difficulty, weakness, light-headedness, numbness and headaches.   Hematological: Negative.  Negative for adenopathy. Does not  bruise/bleed easily.   Psychiatric/Behavioral: Negative for agitation, behavioral problems, confusion, decreased concentration, dysphoric mood, hallucinations, self-injury, sleep disturbance and suicidal ideas. The patient is not nervous/anxious and is not hyperactive.    All other systems reviewed and are negative.      Physical Exam:     Physical Exam  Constitutional:       Appearance: She is well-developed.   HENT:      Head: Normocephalic and atraumatic.      Right Ear: External ear normal.      Left Ear: External ear normal.   Eyes:      Conjunctiva/sclera: Conjunctivae normal.      Pupils: Pupils are equal, round, and reactive to light.   Cardiovascular:      Rate and Rhythm: Normal rate and regular rhythm.      Heart sounds: Normal heart sounds.   Pulmonary:      Effort: Pulmonary effort is normal.      Breath sounds: Normal breath sounds.   Abdominal:      General: Bowel sounds are normal. There is no distension.      Palpations: Abdomen is soft. There is no mass.      Tenderness: There is no abdominal tenderness. There is no guarding or rebound.   Genitourinary:     Vagina: No vaginal discharge.   Musculoskeletal:         General: Normal range of motion.   Skin:     General: Skin is warm and dry.   Neurological:      Mental Status: She is alert.      Deep Tendon Reflexes: Reflexes are normal and symmetric.   Psychiatric:         Behavior: Behavior normal.         Thought Content: Thought content normal.         Judgment: Judgment normal.         I have reviewed the following portions of the patient's history: Allergies, current medications, past family history, past medical history, past social history, past surgical history, problem list, and ROS and confirm it is accurate.      Procedure:       Assessment/Plan:   Renal calculus-we discussed the presence of the stone. We discussed the various therapeutic options available including percutaneous nephrostolithotomy, ureteroscopy and extracorporeal shockwave   lithotripsy.  We discussed the risks of lithotripsy including the passage of stones, the development of a large string of stones in the distal ureter known as Steinstrasse.  In the 3% incidence of that we will need to proceed with a ureteroscopy for obstructing fragments.  Extremely rare incidence of renal hematoma and the significance of this.  We discussed percutaneous nephrostolithotomy and its use as well as the risks and benefits such as the need for postoperative hospitalization, and the risk of damage to the kidney and the remote risk of a nephrectomy.  We also discussed the use of ureteroscopy in the upper tracts.  That this is as a decreased success rate to completely remove the stones on the first option and that very likely a stent will be required requiring an additional procedure for removal.  We discussed the absolute relative indicators for intervention including the presence of sepsis and pain we cannot control ais the primary need for urgent intervention.  We discussed placement of a stent if indicated and the management of the stent as well.  Has bilateral nephrocalcinosis currently stable  Adrenal adenoma-get MRI to confirm notify patient when available                  This document has been electronically signed by JOHN AHUMADA MD January 28, 2022 13:42 EST   Resolved Notify your PMD for any vomiting or diarrhea - follow with Dr Lara (Gastroenterology) within 2 weeks of discharge.  Continue your current medication regime  Follow up labs - Sodium level in one week. Follow up with your PMD. Continue Losartin as recommended Continue Lipitor

## 2022-04-07 NOTE — PATIENT PROFILE ADULT. - FUNCTIONAL LEVEL PRIOR: TOILETING
Called pt and informed them of results as stated below. Pt states understanding with no further questions at this time.   (0) independent

## 2022-09-02 NOTE — H&P ADULT - PROBLEM/PLAN-5
DISPLAY PLAN FREE TEXT Abbe Flap (Lower To Upper Lip) Text: The defect of the upper lip was assessed and measured.  Given the location and size of the defect, an Abbe flap was deemed most appropriate.  Using a sterile surgical marker, an appropriate Abbe flap was measured and drawn on the lower lip. Local anesthesia was then infiltrated. A scalpel was then used to incise the upper lip through and through the skin, vermilion, muscle and mucosa, leaving the flap pedicled on the opposite side.  The flap was then rotated and transferred to the lower lip defect.  The flap was then sutured into place with a three layer technique, closing the orbicularis oris muscle layer with subcutaneous buried sutures, followed by a mucosal layer and an epidermal layer.

## 2023-03-19 ENCOUNTER — EMERGENCY (EMERGENCY)
Facility: HOSPITAL | Age: 83
LOS: 1 days | Discharge: HOME | End: 2023-03-19
Attending: EMERGENCY MEDICINE
Payer: MEDICARE

## 2023-03-19 VITALS
DIASTOLIC BLOOD PRESSURE: 88 MMHG | SYSTOLIC BLOOD PRESSURE: 156 MMHG | HEART RATE: 71 BPM | TEMPERATURE: 98 F | OXYGEN SATURATION: 97 % | RESPIRATION RATE: 18 BRPM

## 2023-03-19 VITALS
DIASTOLIC BLOOD PRESSURE: 77 MMHG | RESPIRATION RATE: 18 BRPM | HEART RATE: 60 BPM | TEMPERATURE: 98 F | HEIGHT: 59 IN | OXYGEN SATURATION: 98 % | SYSTOLIC BLOOD PRESSURE: 183 MMHG | WEIGHT: 145.06 LBS

## 2023-03-19 DIAGNOSIS — Z98.890 OTHER SPECIFIED POSTPROCEDURAL STATES: Chronic | ICD-10-CM

## 2023-03-19 LAB
ALBUMIN SERPL ELPH-MCNC: 3.7 G/DL — SIGNIFICANT CHANGE UP (ref 3.5–5)
ALP SERPL-CCNC: 66 U/L — SIGNIFICANT CHANGE UP (ref 40–120)
ALT FLD-CCNC: 23 U/L DA — SIGNIFICANT CHANGE UP (ref 10–60)
ANION GAP SERPL CALC-SCNC: 9 MMOL/L — SIGNIFICANT CHANGE UP (ref 5–17)
APPEARANCE UR: CLEAR — SIGNIFICANT CHANGE UP
AST SERPL-CCNC: 33 U/L — SIGNIFICANT CHANGE UP (ref 10–40)
BACTERIA # UR AUTO: ABNORMAL /HPF
BASOPHILS # BLD AUTO: 0.05 K/UL — SIGNIFICANT CHANGE UP (ref 0–0.2)
BASOPHILS NFR BLD AUTO: 0.6 % — SIGNIFICANT CHANGE UP (ref 0–2)
BILIRUB SERPL-MCNC: 0.7 MG/DL — SIGNIFICANT CHANGE UP (ref 0.2–1.2)
BILIRUB UR-MCNC: NEGATIVE — SIGNIFICANT CHANGE UP
BUN SERPL-MCNC: 23 MG/DL — HIGH (ref 7–18)
CALCIUM SERPL-MCNC: 9.6 MG/DL — SIGNIFICANT CHANGE UP (ref 8.4–10.5)
CHLORIDE SERPL-SCNC: 100 MMOL/L — SIGNIFICANT CHANGE UP (ref 96–108)
CK SERPL-CCNC: 118 U/L — SIGNIFICANT CHANGE UP (ref 21–215)
CO2 SERPL-SCNC: 24 MMOL/L — SIGNIFICANT CHANGE UP (ref 22–31)
COLOR SPEC: YELLOW — SIGNIFICANT CHANGE UP
CREAT SERPL-MCNC: 1.16 MG/DL — SIGNIFICANT CHANGE UP (ref 0.5–1.3)
DIFF PNL FLD: ABNORMAL
EGFR: 46 ML/MIN/1.73M2 — LOW
EOSINOPHIL # BLD AUTO: 0.01 K/UL — SIGNIFICANT CHANGE UP (ref 0–0.5)
EOSINOPHIL NFR BLD AUTO: 0.1 % — SIGNIFICANT CHANGE UP (ref 0–6)
EPI CELLS # UR: ABNORMAL /HPF
GLUCOSE SERPL-MCNC: 133 MG/DL — HIGH (ref 70–99)
GLUCOSE UR QL: NEGATIVE — SIGNIFICANT CHANGE UP
HCT VFR BLD CALC: 40.8 % — SIGNIFICANT CHANGE UP (ref 34.5–45)
HGB BLD-MCNC: 13.1 G/DL — SIGNIFICANT CHANGE UP (ref 11.5–15.5)
IMM GRANULOCYTES NFR BLD AUTO: 0.4 % — SIGNIFICANT CHANGE UP (ref 0–0.9)
KETONES UR-MCNC: ABNORMAL
LEUKOCYTE ESTERASE UR-ACNC: ABNORMAL
LIDOCAIN IGE QN: 488 U/L — HIGH (ref 73–393)
LYMPHOCYTES # BLD AUTO: 0.81 K/UL — LOW (ref 1–3.3)
LYMPHOCYTES # BLD AUTO: 9.5 % — LOW (ref 13–44)
MAGNESIUM SERPL-MCNC: 2 MG/DL — SIGNIFICANT CHANGE UP (ref 1.6–2.6)
MCHC RBC-ENTMCNC: 28.5 PG — SIGNIFICANT CHANGE UP (ref 27–34)
MCHC RBC-ENTMCNC: 32.1 GM/DL — SIGNIFICANT CHANGE UP (ref 32–36)
MCV RBC AUTO: 88.7 FL — SIGNIFICANT CHANGE UP (ref 80–100)
MONOCYTES # BLD AUTO: 0.27 K/UL — SIGNIFICANT CHANGE UP (ref 0–0.9)
MONOCYTES NFR BLD AUTO: 3.2 % — SIGNIFICANT CHANGE UP (ref 2–14)
NEUTROPHILS # BLD AUTO: 7.32 K/UL — SIGNIFICANT CHANGE UP (ref 1.8–7.4)
NEUTROPHILS NFR BLD AUTO: 86.2 % — HIGH (ref 43–77)
NITRITE UR-MCNC: NEGATIVE — SIGNIFICANT CHANGE UP
NRBC # BLD: 0 /100 WBCS — SIGNIFICANT CHANGE UP (ref 0–0)
NT-PROBNP SERPL-SCNC: 312 PG/ML — SIGNIFICANT CHANGE UP (ref 0–450)
PH UR: 6.5 — SIGNIFICANT CHANGE UP (ref 5–8)
PLATELET # BLD AUTO: 170 K/UL — SIGNIFICANT CHANGE UP (ref 150–400)
POTASSIUM SERPL-MCNC: 4.5 MMOL/L — SIGNIFICANT CHANGE UP (ref 3.5–5.3)
POTASSIUM SERPL-SCNC: 4.5 MMOL/L — SIGNIFICANT CHANGE UP (ref 3.5–5.3)
PROT SERPL-MCNC: 8 G/DL — SIGNIFICANT CHANGE UP (ref 6–8.3)
PROT UR-MCNC: 15
RBC # BLD: 4.6 M/UL — SIGNIFICANT CHANGE UP (ref 3.8–5.2)
RBC # FLD: 12.3 % — SIGNIFICANT CHANGE UP (ref 10.3–14.5)
RBC CASTS # UR COMP ASSIST: ABNORMAL /HPF (ref 0–2)
SARS-COV-2 RNA SPEC QL NAA+PROBE: SIGNIFICANT CHANGE UP
SODIUM SERPL-SCNC: 133 MMOL/L — LOW (ref 135–145)
SP GR SPEC: 1.01 — SIGNIFICANT CHANGE UP (ref 1.01–1.02)
TROPONIN I, HIGH SENSITIVITY RESULT: 13.3 NG/L — SIGNIFICANT CHANGE UP
UROBILINOGEN FLD QL: NEGATIVE — SIGNIFICANT CHANGE UP
WBC # BLD: 8.49 K/UL — SIGNIFICANT CHANGE UP (ref 3.8–10.5)
WBC # FLD AUTO: 8.49 K/UL — SIGNIFICANT CHANGE UP (ref 3.8–10.5)
WBC UR QL: SIGNIFICANT CHANGE UP /HPF (ref 0–5)

## 2023-03-19 PROCEDURE — 93005 ELECTROCARDIOGRAM TRACING: CPT

## 2023-03-19 PROCEDURE — 82962 GLUCOSE BLOOD TEST: CPT

## 2023-03-19 PROCEDURE — 85025 COMPLETE CBC W/AUTO DIFF WBC: CPT

## 2023-03-19 PROCEDURE — 83690 ASSAY OF LIPASE: CPT

## 2023-03-19 PROCEDURE — 87635 SARS-COV-2 COVID-19 AMP PRB: CPT

## 2023-03-19 PROCEDURE — 82550 ASSAY OF CK (CPK): CPT

## 2023-03-19 PROCEDURE — 36415 COLL VENOUS BLD VENIPUNCTURE: CPT

## 2023-03-19 PROCEDURE — 81001 URINALYSIS AUTO W/SCOPE: CPT

## 2023-03-19 PROCEDURE — 70450 CT HEAD/BRAIN W/O DYE: CPT | Mod: MA

## 2023-03-19 PROCEDURE — 84484 ASSAY OF TROPONIN QUANT: CPT

## 2023-03-19 PROCEDURE — 99285 EMERGENCY DEPT VISIT HI MDM: CPT | Mod: 25

## 2023-03-19 PROCEDURE — 83880 ASSAY OF NATRIURETIC PEPTIDE: CPT

## 2023-03-19 PROCEDURE — 96374 THER/PROPH/DIAG INJ IV PUSH: CPT

## 2023-03-19 PROCEDURE — 71045 X-RAY EXAM CHEST 1 VIEW: CPT

## 2023-03-19 PROCEDURE — 80053 COMPREHEN METABOLIC PANEL: CPT

## 2023-03-19 PROCEDURE — 71045 X-RAY EXAM CHEST 1 VIEW: CPT | Mod: 26

## 2023-03-19 PROCEDURE — 93010 ELECTROCARDIOGRAM REPORT: CPT

## 2023-03-19 PROCEDURE — 83735 ASSAY OF MAGNESIUM: CPT

## 2023-03-19 PROCEDURE — 99285 EMERGENCY DEPT VISIT HI MDM: CPT

## 2023-03-19 PROCEDURE — 70450 CT HEAD/BRAIN W/O DYE: CPT | Mod: 26,MA

## 2023-03-19 RX ORDER — METOCLOPRAMIDE HCL 10 MG
10 TABLET ORAL ONCE
Refills: 0 | Status: COMPLETED | OUTPATIENT
Start: 2023-03-19 | End: 2023-03-19

## 2023-03-19 RX ORDER — ASPIRIN/CALCIUM CARB/MAGNESIUM 324 MG
162 TABLET ORAL ONCE
Refills: 0 | Status: COMPLETED | OUTPATIENT
Start: 2023-03-19 | End: 2023-03-19

## 2023-03-19 RX ORDER — MECLIZINE HCL 12.5 MG
12.5 TABLET ORAL ONCE
Refills: 0 | Status: COMPLETED | OUTPATIENT
Start: 2023-03-19 | End: 2023-03-19

## 2023-03-19 RX ORDER — LOSARTAN POTASSIUM 100 MG/1
50 TABLET, FILM COATED ORAL ONCE
Refills: 0 | Status: COMPLETED | OUTPATIENT
Start: 2023-03-19 | End: 2023-03-19

## 2023-03-19 RX ORDER — SODIUM CHLORIDE 9 MG/ML
1000 INJECTION INTRAMUSCULAR; INTRAVENOUS; SUBCUTANEOUS
Refills: 0 | Status: DISCONTINUED | OUTPATIENT
Start: 2023-03-19 | End: 2023-03-23

## 2023-03-19 RX ORDER — METOPROLOL TARTRATE 50 MG
25 TABLET ORAL ONCE
Refills: 0 | Status: COMPLETED | OUTPATIENT
Start: 2023-03-19 | End: 2023-03-19

## 2023-03-19 RX ADMIN — Medication 25 MILLIGRAM(S): at 20:50

## 2023-03-19 RX ADMIN — LOSARTAN POTASSIUM 50 MILLIGRAM(S): 100 TABLET, FILM COATED ORAL at 20:50

## 2023-03-19 RX ADMIN — SODIUM CHLORIDE 125 MILLILITER(S): 9 INJECTION INTRAMUSCULAR; INTRAVENOUS; SUBCUTANEOUS at 17:43

## 2023-03-19 RX ADMIN — Medication 12.5 MILLIGRAM(S): at 18:16

## 2023-03-19 RX ADMIN — Medication 162 MILLIGRAM(S): at 17:42

## 2023-03-19 RX ADMIN — Medication 104 MILLIGRAM(S): at 19:17

## 2023-03-19 NOTE — ED PROVIDER NOTE - CLINICAL SUMMARY MEDICAL DECISION MAKING FREE TEXT BOX
pt feeling nausea, dizzy, constat retching, concern for UTI, elevated B/P, vertigo, will get labs., CT head, give meds, reassess  Daughter states pt's very sensitive to her abd exam all the time, will get lipase, if elevated then will get CT A/P

## 2023-03-19 NOTE — ED ADULT NURSE NOTE - OBJECTIVE STATEMENT
As per pt, c/o high BP w/ n/v and dizziness since 1030am this morning. Pt reports, she forgot to take BP meds this morning, but take the BP med before arrival to the ED today. Pt denies all other symptoms.

## 2023-03-19 NOTE — ED PROVIDER NOTE - CARE PLAN
1 Principal Discharge DX:	Dizziness  Secondary Diagnosis:	Elevated blood pressure reading  Secondary Diagnosis:	Elevated lipase

## 2023-03-19 NOTE — ED PROVIDER NOTE - PROGRESS NOTE DETAILS
Labs/CT results explained to daughter.  Pt with elevated lipase, since on Exam, there is mild abd tenderness, CT A/P was ordered.  Pt's daughter claims pt always c/o abd pain whenever anyone examine her abd.  Pt daughter now wants to go home.  Pt denies any abd pain with no palpation.  Will d/c home.  Advised if abd pain worsens, vomiting, to bring pt back to ED   Will not send home with Antivert 2/2 pt's age

## 2023-03-19 NOTE — ED PROVIDER NOTE - NSICDXPASTMEDICALHX_GEN_ALL_CORE_FT
PAST MEDICAL HISTORY:  Allergy     Asthma     Dementia     Depression     HTN (hypertension)     Hypercholesterolemia

## 2023-03-19 NOTE — ED PROVIDER NOTE - NSFOLLOWUPINSTRUCTIONS_ED_ALL_ED_FT
Managing Your Hypertension      Hypertension, also called high blood pressure, is when the force of the blood pressing against the walls of the arteries is too strong. Arteries are blood vessels that carry blood from your heart throughout your body. Hypertension forces the heart to work harder to pump blood and may cause the arteries to become narrow or stiff.      Understanding blood pressure readings    A blood pressure reading includes a higher number over a lower number:  •The first, or top, number is called the systolic pressure. It is a measure of the pressure in your arteries as your heart beats.      •The second, or bottom number, is called the diastolic pressure. It is a measure of the pressure in your arteries as the heart relaxes.      For most people, a normal blood pressure is below 120/80. Your personal target blood pressure may vary depending on your medical conditions, your age, and other factors.    Blood pressure is classified into four stages. Based on your blood pressure reading, your health care provider may use the following stages to determine what type of treatment you need, if any. Systolic pressure and diastolic pressure are measured in a unit called millimeters of mercury (mmHg).    Normal    •Systolic pressure: below 120.      •Diastolic pressure: below 80.      Elevated    •Systolic pressure: 120–129.      •Diastolic pressure: below 80.      Hypertension stage 1    •Systolic pressure: 130–139.      •Diastolic pressure: 80–89.      Hypertension stage 2    •Systolic pressure: 140 or above.      •Diastolic pressure: 90 or above.        How can this condition affect me?    Managing your hypertension is very important. Over time, hypertension can damage the arteries and decrease blood flow to parts of the body, including the brain, heart, and kidneys. Having untreated or uncontrolled hypertension can lead to:  •A heart attack.      •A stroke.      •A weakened blood vessel (aneurysm).      •Heart failure.      •Kidney damage.      •Eye damage.      •Memory and concentration problems.      •Vascular dementia.        What actions can I take to manage this condition?    Hypertension can be managed by making lifestyle changes and possibly by taking medicines. Your health care provider will help you make a plan to bring your blood pressure within a normal range. You may be referred for counseling on a healthy diet and physical activity.      Nutrition   A plate with examples of foods in a healthy diet. •Eat a diet that is high in fiber and potassium, and low in salt (sodium), added sugar, and fat. An example eating plan is called the DASH diet. DASH stands for Dietary Approaches to Stop Hypertension. To eat this way:  •Eat plenty of fresh fruits and vegetables. Try to fill one-half of your plate at each meal with fruits and vegetables.      •Eat whole grains, such as whole-wheat pasta, brown rice, or whole-grain bread. Fill about one-fourth of your plate with whole grains.      •Eat low-fat dairy products.      •Avoid fatty cuts of meat, processed or cured meats, and poultry with skin. Fill about one-fourth of your plate with lean proteins such as fish, chicken without skin, beans, eggs, and tofu.      •Avoid pre-made and processed foods. These tend to be higher in sodium, added sugar, and fat.        •Reduce your daily sodium intake. Many people with hypertension should eat less than 1,500 mg of sodium a day.        Lifestyle   A person riding a bicycle wearing a safety helmet.   •Work with your health care provider to maintain a healthy body weight or to lose weight. Ask what an ideal weight is for you.      •Get at least 30 minutes of exercise that causes your heart to beat faster (aerobic exercise) most days of the week. Activities may include walking, swimming, or biking.      •Include exercise to strengthen your muscles (resistance exercise), such as weight lifting, as part of your weekly exercise routine. Try to do these types of exercises for 30 minutes at least 3 days a week.      • Do not use any products that contain nicotine or tobacco. These products include cigarettes, chewing tobacco, and vaping devices, such as e-cigarettes. If you need help quitting, ask your health care provider.      •Control any long-term (chronic) conditions you have, such as high cholesterol or diabetes.      •Identify your sources of stress and find ways to manage stress. This may include meditation, deep breathing, or making time for fun activities.      Alcohol use   • Do not drink alcohol if:  •Your health care provider tells you not to drink.      •You are pregnant, may be pregnant, or are planning to become pregnant.      •If you drink alcohol:•Limit how much you have to:  •0–1 drink a day for women.      •0–2 drinks a day for men.        •Know how much alcohol is in your drink. In the [place] one drink equals one 12 oz bottle of beer (355 mL), one 5 oz glass of wine (148 mL), or one 1½ oz glass of hard liquor (44 mL).        Medicines     Your health care provider may prescribe medicine if lifestyle changes are not enough to get your blood pressure under control and if:  •Your systolic blood pressure is 130 or higher.      •Your diastolic blood pressure is 80 or higher.      Take medicines only as told by your health care provider. Follow the directions carefully. Blood pressure medicines must be taken as told by your health care provider. The medicine does not work as well when you skip doses. Skipping doses also puts you at risk for problems.      Monitoring  A person checking his or her blood pressure.   Before you monitor your blood pressure:  •Do not smoke, drink caffeinated beverages, or exercise within 30 minutes before taking a measurement.      •Use the bathroom and empty your bladder (urinate).      •Sit quietly for at least 5 minutes before taking measurements.      Monitor your blood pressure at home as told by your health care provider. To do this:  •Sit with your back straight and supported.      •Place your feet flat on the floor. Do not cross your legs.      •Support your arm on a flat surface, such as a table. Make sure your upper arm is at heart level.      •Each time you measure, take two or three readings one minute apart and record the results.      You may also need to have your blood pressure checked regularly by your health care provider.    General information    •Talk with your health care provider about your diet, exercise habits, and other lifestyle factors that may be contributing to hypertension.      •Review all the medicines you take with your health care provider because there may be side effects or interactions.      •Keep all follow-up visits. Your health care provider can help you create and adjust your plan for managing your high blood pressure.        Where to find more information    •National Heart, Lung, and Blood [place]: www.nhlbi.nih.gov      •American Heart Association: www.heart.org        Contact a health care provider if:    •You think you are having a reaction to medicines you have taken.      •You have repeated (recurrent) headaches.      •You feel dizzy.      •You have swelling in your ankles.      •You have trouble with your vision.        Get help right away if:    •You develop a severe headache or confusion.      •You have unusual weakness or numbness, or you feel faint.      •You have severe pain in your chest or abdomen.      •You vomit repeatedly.      •You have trouble breathing.      These symptoms may be an emergency. Get help right away. Call 911.   • Do not wait to see if the symptoms will go away.       • Do not drive yourself to the hospital.         Summary    •Hypertension is when the force of blood pumping through your arteries is too strong. If this condition is not controlled, it may put you at risk for serious complications.      •Your personal target blood pressure may vary depending on your medical conditions, your age, and other factors. For most people, a normal blood pressure is less than 120/80.      •Hypertension is managed by lifestyle changes, medicines, or both.      •Lifestyle changes to help manage hypertension include losing weight, eating a healthy, low-sodium diet, exercising more, stopping smoking, and limiting alcohol.      This information is not intended to replace advice given to you by your health care provider. Make sure you discuss any questions you have with your health care provider.          Acute Pancreatitis    Body outline showing digestive organs, with a close-up of a normal pancreas and a swollen pancreas.   Acute pancreatitis happens when a gland called the pancreas suddenly develops inflammation, making it irritated and swollen. The pancreas is found on the left side of the abdomen, behind the stomach. The pancreas makes proteins (enzymes) that help to digest food. It also releases the hormones glucagon and insulin. These help to regulate blood sugar.    Most sudden (acute) attacks of this condition last a few days and can cause serious problems. Some people become dehydrated and develop low blood pressure. In severe cases, bleeding in the abdomen can lead to shock and can be life-threatening. The lungs, heart, and kidneys may stop working.      What are the causes?    This condition may be caused by:  •Heavy alcohol use.      •Drug use.      •Gallstones or other conditions that can block the tube that drains the pancreas (pancreatic duct).      •A tumor in the pancreas.      Other causes include:  •Being exposed to certain medicines or certain chemicals.      •Having health conditions such as diabetes, high triglycerides, or high calcium levels in your blood. High calcium levels are usually caused by the parathyroid gland being too active.      •An infection in the pancreas.      •Damage caused by an accident (trauma) or by the poison (venom) of a scorpion sting.      •Abdominal surgery.      •Autoimmune pancreatitis. This is when the body's disease-fighting system (immune system) attacks the pancreas.      •Genes that are passed from parent to child (inherited).      In some cases, the cause of this condition is not known.      What are the signs or symptoms?    Symptoms of this condition include:  •Pain in the upper abdomen that may spread (radiate) to the back. Pain may be severe and often worsens after you eat.      •A tender and swollen abdomen.      •Nausea and vomiting.      •Fever.        How is this diagnosed?    This condition may be diagnosed based on:  •A physical exam.      •Blood tests. These include an increased (elevated) level of lipase or amylase.      •Imaging tests, such as CT scans, MRIs, or an ultrasound of the abdomen.        How is this treated?    Treatment for this condition often requires a hospital stay and may include:  •Pain medicine.      •IV fluids.      •Placing a tube in the stomach to remove stomach contents and to control vomiting (nasogastric tube, or NG tube).      •Not eating until vomiting has lessened.    •Treating any underlying conditions that may be the cause. Treatment may include:  •Antibiotic medicines, if your condition is caused by an infection.      •Steroid medicine, if your condition is caused by your immune system attacking your pancreas (autoimmune disease).      •Surgery on the gallbladder or pancreas, if your condition is caused by gallstones or another blockage.          Follow these instructions at home:    Medicines     •Take over-the-counter and prescription medicines only as told by your health care provider.       •If you were prescribed an antibiotic medicine, take it as told by your health care provider. Do not stop using the antibiotic even if you start to feel better.    •Ask your health care provider if the medicine prescribed to you:  •Requires you to avoid driving or using machinery.    •Can cause constipation. You may need to take these actions to prevent or treat constipation:   • Take over-the-counter or prescription medicines.       •Eat foods that are high in fiber, such as beans, whole grains, and fresh fruits and vegetables.       •Limit foods that are high in fat and processed sugars, such as fried or sweet foods.            Eating and drinking   Three cups showing dark yellow, yellow, and pale yellow urine.   •Follow instructions from your health care provider about diet. This may involve avoiding alcohol and having less fat in your diet.      •Eat smaller, more frequent meals. Doing this causes the pancreas to make less digestive fluid.      •Drink enough fluid to keep your urine pale yellow.      • Do not drink alcohol if it caused your condition.      General instructions     • Do not use any products that contain nicotine or tobacco. These products include cigarettes, chewing tobacco, and vaping devices, such as e-cigarettes. If you need help quitting, ask your health care provider.      •Get plenty of rest.      •If directed, check your blood sugar at home as told by your health care provider.      •Keep all follow-up visits. This is important.        Contact a health care provider if:    •You do not get better as fast as expected.      •Your symptoms get worse or you get new symptoms.      •You keep having pain, weakness, or nausea.      •You get better and then pain comes back.      •You have a fever.        Get help right away if:    •You vomit every time you eat or drink.      •Your pain becomes severe.      •Your skin or the white parts of your eyes turn yellow (jaundice).      •You have sudden swelling in your abdomen.      •You feel dizzy or you faint.      •Your blood sugar is high (over 300 mg/dL).      •You vomit blood.      These symptoms may be an emergency. Get help right away. Call 911.   • Do not wait to see if the symptoms will go away.        • Do not drive yourself to the hospital.         Summary    •Acute pancreatitis happens when inflammation of the pancreas suddenly occurs and the pancreas becomes irritated and swollen.      •This condition is typically caused by heavy alcohol use, drug use, or gallstones.      •Treatment for this condition usually requires a stay in the hospital.      This information is not intended to replace advice given to you by your health care provider. Make sure you discuss any questions you have with your health care provider.

## 2023-03-19 NOTE — ED PROVIDER NOTE - CONSTITUTIONAL MENTATION
knows the year, her name, her daughter, where she is/awake/alert/oriented to person, place, time/situation

## 2023-03-19 NOTE — ED PROVIDER NOTE - PATIENT PORTAL LINK FT
You can access the FollowMyHealth Patient Portal offered by [facility] by registering at the following website: [email or web address]. By joining [facility]’s FollowMyHealth portal, you will also be able to view your health information using other applications (apps) compatible with our system.

## 2023-03-19 NOTE — ED PROVIDER NOTE - OBJECTIVE STATEMENT
83-year-old female as per daughter, patient forgot to take her a.m. blood pressure meds.  They are losartan 50 mg, metoprolol 25 mg.  Later on patient complaining of feeling nauseous, vomited numerous times, dizziness, ataxia, no vertigo.  Patient noted BP was elevated denies chest pain, SOB, fever, LOC, headache and coughing

## 2023-03-19 NOTE — ED PROVIDER NOTE - BOWEL SOUNDS
Patient hasn't heard anything from DME for her oxygen order   Patient good number to call [phone]    present x 4 quadrants

## 2023-11-28 NOTE — CONSULT NOTE ADULT - PROBLEM SELECTOR RECOMMENDATION 2
BP now controlled with losartan 100mg daily  hold HCTZ  renal artery duplex to r/o LISA BP now controlled with losartan 100mg daily  hold HCTZ  Check renal artery duplex to r/o LISA details…

## 2025-04-18 NOTE — PROGRESS NOTE ADULT - PROBLEM SELECTOR PLAN 3
- nephrology care noted and appreciated
- nephrology care noted and appreciated  - now normalized
Never